# Patient Record
Sex: FEMALE | Race: OTHER | HISPANIC OR LATINO | URBAN - METROPOLITAN AREA
[De-identification: names, ages, dates, MRNs, and addresses within clinical notes are randomized per-mention and may not be internally consistent; named-entity substitution may affect disease eponyms.]

---

## 2021-05-24 ENCOUNTER — OUTPATIENT (OUTPATIENT)
Dept: OUTPATIENT SERVICES | Facility: HOSPITAL | Age: 66
LOS: 1 days | Discharge: HOME | End: 2021-05-24

## 2021-05-24 DIAGNOSIS — Z11.59 ENCOUNTER FOR SCREENING FOR OTHER VIRAL DISEASES: ICD-10-CM

## 2021-05-27 ENCOUNTER — OUTPATIENT (OUTPATIENT)
Dept: OUTPATIENT SERVICES | Facility: HOSPITAL | Age: 66
LOS: 1 days | Discharge: HOME | End: 2021-05-27
Payer: COMMERCIAL

## 2021-05-27 VITALS
OXYGEN SATURATION: 99 % | RESPIRATION RATE: 16 BRPM | HEART RATE: 82 BPM | SYSTOLIC BLOOD PRESSURE: 145 MMHG | TEMPERATURE: 98 F | DIASTOLIC BLOOD PRESSURE: 65 MMHG

## 2021-05-27 LAB
ANION GAP SERPL CALC-SCNC: 8 MMOL/L — SIGNIFICANT CHANGE UP (ref 7–14)
BUN SERPL-MCNC: 15 MG/DL — SIGNIFICANT CHANGE UP (ref 10–20)
CALCIUM SERPL-MCNC: 9.3 MG/DL — SIGNIFICANT CHANGE UP (ref 8.5–10.1)
CHLORIDE SERPL-SCNC: 102 MMOL/L — SIGNIFICANT CHANGE UP (ref 98–110)
CO2 SERPL-SCNC: 29 MMOL/L — SIGNIFICANT CHANGE UP (ref 17–32)
CREAT SERPL-MCNC: 0.7 MG/DL — SIGNIFICANT CHANGE UP (ref 0.7–1.5)
GLUCOSE SERPL-MCNC: 97 MG/DL — SIGNIFICANT CHANGE UP (ref 70–99)
HCT VFR BLD CALC: 34.5 % — LOW (ref 37–47)
HCT VFR BLD CALC: 35.3 % — LOW (ref 37–47)
HGB BLD-MCNC: 10.9 G/DL — LOW (ref 12–16)
HGB BLD-MCNC: 11.3 G/DL — LOW (ref 12–16)
MCHC RBC-ENTMCNC: 26.6 PG — LOW (ref 27–31)
MCHC RBC-ENTMCNC: 26.7 PG — LOW (ref 27–31)
MCHC RBC-ENTMCNC: 31.6 G/DL — LOW (ref 32–37)
MCHC RBC-ENTMCNC: 32 G/DL — SIGNIFICANT CHANGE UP (ref 32–37)
MCV RBC AUTO: 83.3 FL — SIGNIFICANT CHANGE UP (ref 81–99)
MCV RBC AUTO: 84.1 FL — SIGNIFICANT CHANGE UP (ref 81–99)
NRBC # BLD: 0 /100 WBCS — SIGNIFICANT CHANGE UP (ref 0–0)
NRBC # BLD: 0 /100 WBCS — SIGNIFICANT CHANGE UP (ref 0–0)
PLATELET # BLD AUTO: 250 K/UL — SIGNIFICANT CHANGE UP (ref 130–400)
PLATELET # BLD AUTO: 259 K/UL — SIGNIFICANT CHANGE UP (ref 130–400)
POTASSIUM SERPL-MCNC: 4.6 MMOL/L — SIGNIFICANT CHANGE UP (ref 3.5–5)
POTASSIUM SERPL-SCNC: 4.6 MMOL/L — SIGNIFICANT CHANGE UP (ref 3.5–5)
RBC # BLD: 4.1 M/UL — LOW (ref 4.2–5.4)
RBC # BLD: 4.24 M/UL — SIGNIFICANT CHANGE UP (ref 4.2–5.4)
RBC # FLD: 13 % — SIGNIFICANT CHANGE UP (ref 11.5–14.5)
RBC # FLD: 13.1 % — SIGNIFICANT CHANGE UP (ref 11.5–14.5)
SODIUM SERPL-SCNC: 139 MMOL/L — SIGNIFICANT CHANGE UP (ref 135–146)
WBC # BLD: 10.01 K/UL — SIGNIFICANT CHANGE UP (ref 4.8–10.8)
WBC # BLD: 8.38 K/UL — SIGNIFICANT CHANGE UP (ref 4.8–10.8)
WBC # FLD AUTO: 10.01 K/UL — SIGNIFICANT CHANGE UP (ref 4.8–10.8)
WBC # FLD AUTO: 8.38 K/UL — SIGNIFICANT CHANGE UP (ref 4.8–10.8)

## 2021-05-27 PROCEDURE — 93458 L HRT ARTERY/VENTRICLE ANGIO: CPT | Mod: 26,XU

## 2021-05-27 PROCEDURE — 92978 ENDOLUMINL IVUS OCT C 1ST: CPT | Mod: 26,RC

## 2021-05-27 PROCEDURE — 92928 PRQ TCAT PLMT NTRAC ST 1 LES: CPT | Mod: RC

## 2021-05-27 PROCEDURE — 93010 ELECTROCARDIOGRAM REPORT: CPT

## 2021-05-27 RX ORDER — LOSARTAN POTASSIUM 100 MG/1
1 TABLET, FILM COATED ORAL
Qty: 0 | Refills: 0 | DISCHARGE

## 2021-05-27 RX ORDER — CLOPIDOGREL BISULFATE 75 MG/1
1 TABLET, FILM COATED ORAL
Qty: 30 | Refills: 1
Start: 2021-05-27 | End: 2021-07-25

## 2021-05-27 RX ORDER — PIMECROLIMUS 10 MG/G
1 CREAM TOPICAL
Qty: 0 | Refills: 0 | DISCHARGE

## 2021-05-27 RX ORDER — ATORVASTATIN CALCIUM 80 MG/1
1 TABLET, FILM COATED ORAL
Qty: 30 | Refills: 1
Start: 2021-05-27 | End: 2021-07-25

## 2021-05-27 RX ORDER — ASPIRIN/CALCIUM CARB/MAGNESIUM 324 MG
1 TABLET ORAL
Qty: 30 | Refills: 1
Start: 2021-05-27 | End: 2021-07-25

## 2021-05-27 RX ORDER — METOPROLOL TARTRATE 50 MG
1 TABLET ORAL
Qty: 30 | Refills: 1
Start: 2021-05-27 | End: 2021-07-25

## 2021-05-27 RX ORDER — OLOPATADINE HYDROCHLORIDE 1 MG/ML
1 SOLUTION/ DROPS OPHTHALMIC
Qty: 0 | Refills: 0 | DISCHARGE

## 2021-05-27 RX ORDER — ATORVASTATIN CALCIUM 80 MG/1
1 TABLET, FILM COATED ORAL
Qty: 0 | Refills: 0 | DISCHARGE

## 2021-05-27 NOTE — H&P CARDIOLOGY - HISTORY OF PRESENT ILLNESS
Patient is a 65y Female who presents to the cardiology department for [facility].       Pre cath note:    indication:  [ ] STEMI                [ ] NSTEMI                 [ ] Acute coronary syndrome                     [ ]Unstable Angina   [ ] high risk  [ ] intermediate risk  [ ] low risk                     [ ] Stable Angina     non-invasive testing:                          Date:        5/14/21      result: [x] high risk  [ ] intermediate risk  [ ] low risk    Anti- Anginal medications:                    [ ] not used                       [ ] used                   [ ] not used but strong indication not to use    Ejection Fraction                   [ ] <29            [ ] 30-39%   [ ] 40-49%     [ x>50%    CHF                   [ ] active (within last 14 days on meds   [ ] Chronic (on meds but no exacerbation)    COPD                   [ ] mild (on chronic bronchodilators)  [ ] moderate (on chronic steroid therapy)      [ ] severe (indication for home O2 or PACO2 >50)    Other risk factors:                       [ ] Previous MI                     [ ] CVA/ stroke                    [ ] carotid stent/ CEA                    [ ] PVD/PAD- (arterial aneurysm, non-palpable pulses, tortuous vessel with inability to insert catheter, infra-renal dissection, renal or subclavian artery stenosis)                    [ ] diabetic                    [ ] previous CABG                    [ ] Renal Failure       Adjusted Bleeding Score:     SUBJ:  64 y/o female presents for [facility] s/p Positive out patient exercise stress test 5/14/21. Impression: Significant  ST segment changes concerning for ischemia in inferolateral leads.           REVIEW OF SYSTEMS:  CONSTITUTIONAL: No fever, weight loss, or fatigue  CARDIOLOGY: PAtient denies chest pain, shortness of breath or syncopal episodes.   RESPIRATORY: denies shortness of breath, wheezing.   NEUROLOGICAL: NO weakness, no focal deficits to report.  GI: no BRBPR, no N,V,diarrhea.     PHYSICAL EXAM:  · CONSTITUTIONAL:	Well-developed, well nourished     · RESPIRATORY:   airway patent; breath sounds equal; good air movement; respirations non-labored; clear to auscultation bilaterally; no chest wall tenderness; no intercostal retractions; no rales,rhonchi or wheeze  · CARDIOVASCULAR	regular rate and rhythm  no rub  no murmur    · EXTREMITIES: No cyanosis, clubbing or edema  · VASCULAR: 	Equal and normal pulses (carotid, femoral, dorsalis pedis)  	  Joe Test        Patient is a 65y Female who presents to the cardiology department for [facility].       Pre cath note:    indication:  [ ] STEMI                [ ] NSTEMI                 [ ] Acute coronary syndrome                     [ ]Unstable Angina   [ ] high risk  [ ] intermediate risk  [ ] low risk                     [ ] Stable Angina     non-invasive testing:                          Date:        21      result: [x] high risk  [ ] intermediate risk  [ ] low risk    Anti- Anginal medications:                    [ ] not used                       [ ] used                   [ ] not used but strong indication not to use    Ejection Fraction                   [ ] <29            [ ] 30-39%   [ ] 40-49%     [ x>50%    CHF                   [ ] active (within last 14 days on meds   [ ] Chronic (on meds but no exacerbation)    COPD                   [ ] mild (on chronic bronchodilators)  [ ] moderate (on chronic steroid therapy)      [ ] severe (indication for home O2 or PACO2 >50)    Other risk factors:                       [ ] Previous MI                     [ ] CVA/ stroke                    [ ] carotid stent/ CEA                    [ ] PVD/PAD- (arterial aneurysm, non-palpable pulses, tortuous vessel with inability to insert catheter, infra-renal dissection, renal or subclavian artery stenosis)                    [ ] diabetic                    [ ] previous CABG                    [ ] Renal Failure       Adjusted Bleeding Score:  2.4%         SUBJ:  66 y/o female presents for [facility] s/p Positive out patient exercise stress test 21. Impression: Significant  ST segment changes concerning for ischemia in inferolateral leads. Pt c/o chest tightness intermittently for 1 year.  Pt currently denies chest pain, no SOB, No palpitations.     Past Medical History:  HTN  HLD        Past Surgical History:  Nasal surgery    Family History:   Father:   of MI in 30's  Mother:  CAD      REVIEW OF SYSTEMS:  CONSTITUTIONAL: No fever, weight loss, or fatigue  CARDIOLOGY: Patient denies chest pain, shortness of breath or syncopal episodes.   RESPIRATORY: denies shortness of breath, wheezing.   NEUROLOGICAL: NO weakness, no focal deficits to report.  GI: no BRBPR, no N,V,diarrhea.     PHYSICAL EXAM:  · CONSTITUTIONAL:	Well-developed, well nourished     · RESPIRATORY:   airway patent; breath sounds equal; good air movement; respirations non-labored; clear to auscultation bilaterally; no chest wall tenderness; no intercostal retractions; no rales,rhonchi or wheeze  · CARDIOVASCULAR	regular rate and rhythm  no rub  no murmur    · EXTREMITIES: No cyanosis, clubbing or edema  · VASCULAR: 	Equal and normal pulses (carotid, femoral, dorsalis pedis)  	  Joe Test WNL

## 2021-05-27 NOTE — PROGRESS NOTE ADULT - SUBJECTIVE AND OBJECTIVE BOX
Cardiology Follow up    IMAN OGLESBY   65y Female  PAST MEDICAL & SURGICAL HISTORY:  Prediabetes    Hypertension    DDD (degenerative disc disease), lumbar    Obesity         HPI:  Patient is a 65y Female who presents to the cardiology department for [facility].     SUBJ:  64 y/o female presents for [facility] s/p Positive out patient exercise stress test 21. Impression: Significant  ST segment changes concerning for ischemia in inferolateral leads. Pt c/o chest tightness intermittently for 1 year.  Pt currently denies chest pain, no SOB, No palpitations.     Past Medical History:  HTN  HLD        Past Surgical History:  Nasal surgery    Family History:   Father:   of MI in 30's  Mother:  CAD          Allergies    acetaminophen (Rash)  diphenhydrAMINE (Rash)  peanuts (Anaphylaxis)  penicillin (Short breath)  pseudoephedrine (Rash)  shellfish (Short breath; Hives)  strawberry (Hives)  sulfanilamide topical (Rash)    Intolerances      Patient without complaints. Pt ambulated without issues/symptoms  Denies CP, SOB, palpitations, or dizziness  No events on telemetry     Vital Signs Last 24 Hrs    HR: 68  BP: 119/59  RR: 16  SpO2: 99%           REVIEW OF SYSTEMS:          All negative except as mentioned in HPI    PHYSICAL EXAM:           CONSTITUTIONAL: Well-developed; well-nourished; in no acute distress  	SKIN: warm, dry  	HEAD: Normocephalic; atraumatic  	EYES: PERRL.  	ENT: No nasal discharge, airway clear, mucous membranes moist  	NECK: Supple; non tender.  	CARD: +S1, +S2, no murmurs, gallops, or rubs. Regular rate and rhythm    	RESP: No wheezes, rales or rhonchi. CTA B/L  	ABD: soft ntnd, + BS x 4 quadrants  	EXT: moves all extremities,  no clubbing, cyanosis or edema  	NEURO: Alert and oriented x3, no focal deficits          PSYCH: Cooperative, appropriate          VASCULAR:  +2 Rad / +2 PTs / + 2 DPs          EXTREMITY:             Right Groin:  Dressing D/C/I, access site soft, no hematoma, no pain, + pulses , no sign of infection, no numbness  	               ECG:   EKG 1600 PENDING                                                                                                                     LABS:        CBC 1600 Pending                         10.9   8.38  )-----------( 250      ( 27 May 2021 10:29 )             34.5     05-    139  |  102  |  15  ----------------------------<  97  4.6   |  29  |  0.7    Ca    9.3      27 May 2021 10:29              A/P:  I discussed the case with Cardiologist Dr. De La Torre  and recommend the following:    S/P PCI:    INTERVENTION  PCI of prox and mid RCA    IMPLANTS:  Synergy 3.0 38, 3.5 12    POST-OP DIAGNOSIS  1 V CAD          	         Continue DAPT ( Aspirin 81 mg PO Daily and   Plavix 75mg po daily  ),  B-Blocker, ARB,  Statin Therapy                   Patient given 30 day supply of ( Aspirin 81 mg daily and Plavix 75 mg daily ) to take at home                   Start Toprol XL 25mg po daily                   Increase Atorvastatin to 80mg po at HS                    CBC @ 1600                   EKG @ 1600                   NS @ 100 ml/hr x 6hrs                   Monitor access site                   Patient agreeing to take DAPT for at least one year or as directed by cardiologist                    Pt given instructions on importance of taking antiplatelet medication or risk acute stent thrombosis/death                   Post cath instructions, access site care and activity restrictions reviewed with patient                     Discussed with patient to return to hospital if experience chest pain, shortness breath, dizziness and site bleeding                   Aggressive risk factor modification, diet counseling, smoking cessation discussed with patient                       Cardiac Rehab info provided/ referral and communication to cardiac rehab provided.                    Can discharge patient @1800  from cardiac standpoint after ambulating without symptoms and access site wnl and ECG reviewed                    Follow up with Cardiology Dr. De La Torre in one to two weeks.  Instructed to call and make an appointment

## 2021-05-27 NOTE — CHART NOTE - NSCHARTNOTEFT_GEN_A_CORE
PRE-OP DIAGNOSIS: suspected CAD/abnormal stress test    PROCEDURE: [facility] with coronary angiography    Physician: DALILA De La Torre  Assistant: OCTAVIO Rey    ANESTHESIA TYPE:  [ x ] Sedation  [ x ] Local/Regional    ESTIMATED BLOOD LOSS:    10   mL    CONDITION  [ x ]Good    SPECIMENS REMOVED (IF APPLICABLE): None    IV CONTRAST: 150   mL    FINDINGS    Left Heart Catheterization:    LVEDP: 19  1 V CAD  Right radial 5 Fr - radial D stat  Right femoral 6 Fr  - Perclose    The coronary circulation is right dominant. There was 1-vessel coronary artery disease (RCA) with AUC score of 7 Left main: Normal. LAD: The vessel was medium sized. Proximal LAD: Angiography showed minor luminal irregularities with no flow limiting lesions. Mid LAD: The vessel was mildly calcified. Angiography showed moderate atherosclerosis with no clinical lesions appreciated. Distal LAD: Angiography showed minor luminal irregularities with no flow limiting lesions. 1st diagonal: Angiography showed mild atherosclerosis with no flow limiting lesions. Circumflex: The vessel was medium sized. Proximal circumflex: There was a discrete 60 % stenosis. Distal circumflex: Angiography showed mild atherosclerosis with no flow limiting lesions. 1st obtuse marginal: The vessel was small to medium sized. Angiography showed minor luminal irregularities with no flow limiting lesions. RCA: The vessel was medium sized. Proximal RCA: There was a diffuse 95 % stenosis. Mid RCA: There was a diffuse 80 % stenosis. Distal RCA: There was a discrete 40 % stenosis. Right PDA: Angiography showed minor luminal irregularities with no flow limiting lesions. Right posterolateral segment: Angiography showed minor luminal irregularities with no flow limiting lesions.     INTERVENTION  PCI of prox and mid RCA    IMPLANTS:  Synergy 3.0 38, 3.5 12    POST-OP DIAGNOSIS  1 V CAD    PLAN OF CARE  [x ] D/C Home today  [x ]  Continue DAPT, B-blocker & Statin therapy   cc/hr for 6 hr

## 2021-05-28 PROBLEM — M51.36 OTHER INTERVERTEBRAL DISC DEGENERATION, LUMBAR REGION: Chronic | Status: ACTIVE | Noted: 2021-05-27

## 2021-05-28 PROBLEM — I10 ESSENTIAL (PRIMARY) HYPERTENSION: Chronic | Status: ACTIVE | Noted: 2021-05-27

## 2021-05-28 PROBLEM — R73.03 PREDIABETES: Chronic | Status: ACTIVE | Noted: 2021-05-27

## 2021-05-28 PROBLEM — E66.9 OBESITY, UNSPECIFIED: Chronic | Status: ACTIVE | Noted: 2021-05-27

## 2021-05-28 PROBLEM — Z00.00 ENCOUNTER FOR PREVENTIVE HEALTH EXAMINATION: Status: ACTIVE | Noted: 2021-05-28

## 2021-06-08 ENCOUNTER — NON-APPOINTMENT (OUTPATIENT)
Age: 66
End: 2021-06-08

## 2021-06-08 DIAGNOSIS — E66.9 OBESITY, UNSPECIFIED: ICD-10-CM

## 2021-06-08 DIAGNOSIS — R94.39 ABNORMAL RESULT OF OTHER CARDIOVASCULAR FUNCTION STUDY: ICD-10-CM

## 2021-06-08 DIAGNOSIS — Z88.2 ALLERGY STATUS TO SULFONAMIDES: ICD-10-CM

## 2021-06-08 DIAGNOSIS — I25.10 ATHEROSCLEROTIC HEART DISEASE OF NATIVE CORONARY ARTERY WITHOUT ANGINA PECTORIS: ICD-10-CM

## 2021-06-08 DIAGNOSIS — Z82.49 FAMILY HISTORY OF ISCHEMIC HEART DISEASE AND OTHER DISEASES OF THE CIRCULATORY SYSTEM: ICD-10-CM

## 2021-06-08 DIAGNOSIS — Z88.0 ALLERGY STATUS TO PENICILLIN: ICD-10-CM

## 2021-06-08 DIAGNOSIS — I10 ESSENTIAL (PRIMARY) HYPERTENSION: ICD-10-CM

## 2021-06-08 DIAGNOSIS — E78.00 PURE HYPERCHOLESTEROLEMIA, UNSPECIFIED: ICD-10-CM

## 2021-06-16 ENCOUNTER — APPOINTMENT (OUTPATIENT)
Dept: CARDIOLOGY | Facility: CLINIC | Age: 66
End: 2021-06-16
Payer: COMMERCIAL

## 2021-06-16 VITALS
WEIGHT: 207 LBS | HEART RATE: 71 BPM | SYSTOLIC BLOOD PRESSURE: 126 MMHG | DIASTOLIC BLOOD PRESSURE: 70 MMHG | TEMPERATURE: 98 F | HEIGHT: 59 IN | BODY MASS INDEX: 41.73 KG/M2

## 2021-06-16 DIAGNOSIS — Z78.9 OTHER SPECIFIED HEALTH STATUS: ICD-10-CM

## 2021-06-16 DIAGNOSIS — Z86.69 PERSONAL HISTORY OF OTHER DISEASES OF THE NERVOUS SYSTEM AND SENSE ORGANS: ICD-10-CM

## 2021-06-16 PROCEDURE — 93000 ELECTROCARDIOGRAM COMPLETE: CPT

## 2021-06-16 PROCEDURE — 99214 OFFICE O/P EST MOD 30 MIN: CPT

## 2021-06-16 PROCEDURE — 99072 ADDL SUPL MATRL&STAF TM PHE: CPT

## 2021-06-16 RX ORDER — PIMECROLIMUS 10 MG/G
1 CREAM TOPICAL
Refills: 0 | Status: ACTIVE | COMMUNITY

## 2021-06-16 RX ORDER — OLOPATADINE HCL 1 MG/ML
0.1 SOLUTION/ DROPS OPHTHALMIC
Refills: 0 | Status: ACTIVE | COMMUNITY

## 2021-06-16 NOTE — REASON FOR VISIT
[Symptom and Test Evaluation] : symptom and test evaluation [Arrhythmia/ECG Abnorrmalities] : arrhythmia/ECG abnormalities [Hyperlipidemia] : hyperlipidemia [Coronary Artery Disease] : coronary artery disease

## 2021-06-16 NOTE — PHYSICAL EXAM
[Well Developed] : well developed [Well Nourished] : well nourished [No Acute Distress] : no acute distress [Normal Venous Pressure] : normal venous pressure [No Carotid Bruit] : no carotid bruit [Normal S1, S2] : normal S1, S2 [No Murmur] : no murmur [No Rub] : no rub [No Gallop] : no gallop [Clear Lung Fields] : clear lung fields [Good Air Entry] : good air entry [No Respiratory Distress] : no respiratory distress  [Soft] : abdomen soft [Non Tender] : non-tender [No Masses/organomegaly] : no masses/organomegaly [Normal Bowel Sounds] : normal bowel sounds [Normal Gait] : normal gait [No Edema] : no edema [No Cyanosis] : no cyanosis [No Clubbing] : no clubbing [No Varicosities] : no varicosities [No Rash] : no rash [No Skin Lesions] : no skin lesions [Moves all extremities] : moves all extremities [No Focal Deficits] : no focal deficits [Normal Speech] : normal speech [Alert and Oriented] : alert and oriented [Normal memory] : normal memory

## 2021-06-21 NOTE — REVIEW OF SYSTEMS
[Negative] : Heme/Lymph [Feeling Fatigued] : feeling fatigued [Myalgia] : myalgia [FreeTextEntry5] : see hpi

## 2021-06-21 NOTE — HISTORY OF PRESENT ILLNESS
[FreeTextEntry1] : 66 yo female with pmhx as below is here for a f/up visit\par 05/21 s/p cath/pci of rca with rashmi\par post pci course sign for fatigue, sob and declined in ET\par on short term leave from work\par not sure if new meds attributing to the issues\par no other cvs complains\par ros is otherwise as below\par

## 2021-06-21 NOTE — ASSESSMENT
[FreeTextEntry1] : 66 yo female with pmhx and presentation as above\par cad/pci of rca with rashmi\par dyslipidemia\par htn\par cont all meds\par change bb to qhs\par may try taking lipitor 4-5/weekly\par add zetia\par stay off work until symptoms resolve\par aggressive risk modif\par diet and act as tolerated\par rtc 6-8 weeks\par

## 2021-08-25 ENCOUNTER — APPOINTMENT (OUTPATIENT)
Dept: CARDIOLOGY | Facility: CLINIC | Age: 66
End: 2021-08-25
Payer: COMMERCIAL

## 2021-08-25 VITALS
TEMPERATURE: 97.8 F | BODY MASS INDEX: 41.73 KG/M2 | WEIGHT: 207 LBS | HEART RATE: 64 BPM | SYSTOLIC BLOOD PRESSURE: 120 MMHG | HEIGHT: 59 IN | DIASTOLIC BLOOD PRESSURE: 70 MMHG

## 2021-08-25 DIAGNOSIS — R94.31 ABNORMAL ELECTROCARDIOGRAM [ECG] [EKG]: ICD-10-CM

## 2021-08-25 PROCEDURE — 93000 ELECTROCARDIOGRAM COMPLETE: CPT

## 2021-08-25 PROCEDURE — 99214 OFFICE O/P EST MOD 30 MIN: CPT

## 2021-08-25 RX ORDER — ACETAMINOPHEN AND CODEINE 300; 30 MG/1; MG/1
300-30 TABLET ORAL 3 TIMES DAILY
Refills: 0 | Status: ACTIVE | COMMUNITY

## 2021-08-25 RX ORDER — ASPIRIN 81 MG
81 TABLET, DELAYED RELEASE (ENTERIC COATED) ORAL DAILY
Refills: 0 | Status: DISCONTINUED | COMMUNITY
End: 2021-08-25

## 2021-08-25 NOTE — REVIEW OF SYSTEMS
[Feeling Fatigued] : feeling fatigued [Myalgia] : myalgia [Negative] : Heme/Lymph [FreeTextEntry5] : see hpi

## 2021-08-25 NOTE — ASSESSMENT
[FreeTextEntry1] : 64 yo female with pmhx and presentation as above\par cad/pci of rca with rashmi\par dyslipidemia\par htn\par cont all meds\par change bb to qhs\par may try taking lipitor 40 mg 4-5/weekly\par cont zetia 10 mg\par stay off work until symptoms resolve\par pain mx\par if symptoms continue -  will reapply for repatha or praluent\par aggressive risk modif\par diet and act as tolerated\par rtc 10-12 weeks with Dr. Templeton\par

## 2021-11-17 ENCOUNTER — APPOINTMENT (OUTPATIENT)
Dept: CARDIOLOGY | Facility: CLINIC | Age: 66
End: 2021-11-17
Payer: COMMERCIAL

## 2021-11-17 VITALS
WEIGHT: 205 LBS | TEMPERATURE: 96 F | DIASTOLIC BLOOD PRESSURE: 70 MMHG | SYSTOLIC BLOOD PRESSURE: 110 MMHG | BODY MASS INDEX: 41.33 KG/M2 | HEIGHT: 59 IN

## 2021-11-17 PROCEDURE — 99214 OFFICE O/P EST MOD 30 MIN: CPT

## 2022-01-04 ENCOUNTER — EMERGENCY (EMERGENCY)
Facility: HOSPITAL | Age: 67
LOS: 0 days | Discharge: HOME | End: 2022-01-04
Attending: EMERGENCY MEDICINE | Admitting: EMERGENCY MEDICINE
Payer: COMMERCIAL

## 2022-01-04 ENCOUNTER — APPOINTMENT (OUTPATIENT)
Age: 67
End: 2022-01-04

## 2022-01-04 ENCOUNTER — TRANSCRIPTION ENCOUNTER (OUTPATIENT)
Age: 67
End: 2022-01-04

## 2022-01-04 ENCOUNTER — OUTPATIENT (OUTPATIENT)
Dept: INPATIENT UNIT | Facility: HOSPITAL | Age: 67
LOS: 1 days | Discharge: HOME | End: 2022-01-04

## 2022-01-04 VITALS
RESPIRATION RATE: 18 BRPM | SYSTOLIC BLOOD PRESSURE: 131 MMHG | DIASTOLIC BLOOD PRESSURE: 75 MMHG | TEMPERATURE: 98 F | HEART RATE: 84 BPM | OXYGEN SATURATION: 97 %

## 2022-01-04 VITALS
TEMPERATURE: 98 F | DIASTOLIC BLOOD PRESSURE: 78 MMHG | RESPIRATION RATE: 17 BRPM | HEART RATE: 86 BPM | OXYGEN SATURATION: 96 % | SYSTOLIC BLOOD PRESSURE: 122 MMHG

## 2022-01-04 VITALS
TEMPERATURE: 98 F | SYSTOLIC BLOOD PRESSURE: 146 MMHG | OXYGEN SATURATION: 95 % | RESPIRATION RATE: 20 BRPM | DIASTOLIC BLOOD PRESSURE: 68 MMHG | HEART RATE: 92 BPM

## 2022-01-04 DIAGNOSIS — Z87.891 PERSONAL HISTORY OF NICOTINE DEPENDENCE: ICD-10-CM

## 2022-01-04 DIAGNOSIS — Z95.5 PRESENCE OF CORONARY ANGIOPLASTY IMPLANT AND GRAFT: ICD-10-CM

## 2022-01-04 DIAGNOSIS — Z88.0 ALLERGY STATUS TO PENICILLIN: ICD-10-CM

## 2022-01-04 DIAGNOSIS — R50.9 FEVER, UNSPECIFIED: ICD-10-CM

## 2022-01-04 DIAGNOSIS — I10 ESSENTIAL (PRIMARY) HYPERTENSION: ICD-10-CM

## 2022-01-04 DIAGNOSIS — U07.1 COVID-19: ICD-10-CM

## 2022-01-04 DIAGNOSIS — J02.9 ACUTE PHARYNGITIS, UNSPECIFIED: ICD-10-CM

## 2022-01-04 DIAGNOSIS — Z88.2 ALLERGY STATUS TO SULFONAMIDES: ICD-10-CM

## 2022-01-04 DIAGNOSIS — I25.10 ATHEROSCLEROTIC HEART DISEASE OF NATIVE CORONARY ARTERY WITHOUT ANGINA PECTORIS: ICD-10-CM

## 2022-01-04 DIAGNOSIS — Z91.013 ALLERGY TO SEAFOOD: ICD-10-CM

## 2022-01-04 DIAGNOSIS — Z88.8 ALLERGY STATUS TO OTHER DRUGS, MEDICAMENTS AND BIOLOGICAL SUBSTANCES: ICD-10-CM

## 2022-01-04 DIAGNOSIS — Z91.010 ALLERGY TO PEANUTS: ICD-10-CM

## 2022-01-04 DIAGNOSIS — Z91.018 ALLERGY TO OTHER FOODS: ICD-10-CM

## 2022-01-04 DIAGNOSIS — R05.1 ACUTE COUGH: ICD-10-CM

## 2022-01-04 LAB
RAPID RVP RESULT: DETECTED
SARS-COV-2 RNA SPEC QL NAA+PROBE: DETECTED

## 2022-01-04 PROCEDURE — 99284 EMERGENCY DEPT VISIT MOD MDM: CPT

## 2022-01-04 RX ORDER — SOTROVIMAB 62.5 MG/ML
500 INJECTION, SOLUTION, CONCENTRATE INTRAVENOUS ONCE
Refills: 0 | Status: COMPLETED | OUTPATIENT
Start: 2022-01-04 | End: 2022-01-04

## 2022-01-04 RX ORDER — DEXAMETHASONE 0.5 MG/5ML
10 ELIXIR ORAL ONCE
Refills: 0 | Status: COMPLETED | OUTPATIENT
Start: 2022-01-04 | End: 2022-01-04

## 2022-01-04 RX ORDER — SODIUM CHLORIDE 9 MG/ML
250 INJECTION INTRAMUSCULAR; INTRAVENOUS; SUBCUTANEOUS
Refills: 0 | Status: DISCONTINUED | OUTPATIENT
Start: 2022-01-04 | End: 2022-01-04

## 2022-01-04 RX ADMIN — Medication 4 MILLIGRAM(S): at 07:35

## 2022-01-04 RX ADMIN — SODIUM CHLORIDE 25 MILLILITER(S): 9 INJECTION INTRAMUSCULAR; INTRAVENOUS; SUBCUTANEOUS at 14:39

## 2022-01-04 RX ADMIN — SOTROVIMAB 116 MILLIGRAM(S): 62.5 INJECTION, SOLUTION, CONCENTRATE INTRAVENOUS at 12:25

## 2022-01-04 NOTE — ED PROVIDER NOTE - PATIENT PORTAL LINK FT
You can access the FollowMyHealth Patient Portal offered by [facility] by registering at the following website: [email or web address]. By joining [employer]’s FollowMyHealth portal, you will also be able to view your health information using other applications (apps) compatible with our system.

## 2022-01-04 NOTE — ED PROVIDER NOTE - NSICDXPASTMEDICALHX_GEN_ALL_CORE_FT
PAST MEDICAL HISTORY:  DDD (degenerative disc disease), lumbar     Hypertension     Obesity     Prediabetes

## 2022-01-04 NOTE — CHART NOTE - NSCHARTNOTEFT_GEN_A_CORE
Medicine Progress Note    Patient is a 66y old  Female who presents with a chief complaint of Covid+      PHYSICAL EXAM:  Vital Signs Last 24 Hrs  T(C): 36.6 (04 Jan 2022 13:55), Max: 36.7 (04 Jan 2022 12:25)  T(F): 97.9 (04 Jan 2022 13:55), Max: 98.1 (04 Jan 2022 12:35)  HR: 86 (04 Jan 2022 13:55) (79 - 92)  BP: 122/78 (04 Jan 2022 13:55) (119/82 - 146/68)  BP(mean): --  RR: 17 (04 Jan 2022 13:55) (17 - 20)  SpO2: 96% (04 Jan 2022 13:55) (95% - 97%)    CONSTITUTIONAL: NAD, well-developed, well-groomed  ENMT: Moist oral mucosa, no pharyngeal injection or exudates; normal dentition  RESPIRATORY: Normal respiratory effort; lungs are clear to auscultation bilaterally  CARDIOVASCULAR: Regular rate and rhythm, normal S1 and S2, no murmur/rub/gallop; No lower extremity edema; Peripheral pulses are 2+ bilaterally  ABDOMEN: Nontender to palpation, normoactive bowel sounds, no rebound/guarding; No hepatosplenomegaly  PSYCH: A+O to person, place, and time; affect appropriate  NEUROLOGY: CN 2-12 are intact and symmetric; no gross sensory deficits   SKIN: No rashes; no palpable lesions      Pt s/p Ab infusion without complications  -tyl prn temp  -if worsening of condition present to ED

## 2022-01-04 NOTE — ED PROVIDER NOTE - ATTENDING CONTRIBUTION TO CARE
I personally evaluated the patient. I reviewed the Resident’s or Physician Assistant’s note (as assigned above), and agree with the findings and plan except as documented in my note.  66-year-old woman with history of diabetes obesity unvaccinated to COVID presents with sore throat cough fever and malaise for about 5 days patient had a positive COVID test about 2 days ago.  No hypoxemia no evidence increased work of breathing patient's vital signs are otherwise stable.  Work-up in the ED appears normal.  Patient is a good candidate for monoclonal antibodies.  Signed up for monoclonal antibodies to receive infusion.  Unfortunately patient is too late for oral antivirals.  Stable for discharge with pulse ox monitoring at home.  Return if O2 sat less than 94%.

## 2022-01-04 NOTE — ED PROVIDER NOTE - OBJECTIVE STATEMENT
Pt is a 65 yo F w/ pmhx of CAD s/p PCI, HTN presenting w/ cough, sore throat, subjective fever since last thursday. Pt is unvaccinated. She is requesting a swab today. denies any SOB or chest pain.

## 2022-01-04 NOTE — ED PROVIDER NOTE - CLINICAL SUMMARY MEDICAL DECISION MAKING FREE TEXT BOX
66-year-old woman with positive COVID test obesity diabetes unvaccinated.  Patient is very high risk for deterioration.  Symptoms for less than 5 days.  Will sign up for monoclonal antibodies.  No evidence of hypoxia or increased work of breathing.  Stable for discharge for outpatient infusion.  Pulse ox monitoring at home.  If less than 94% needs to return to ED for evaluation.  Stable for discharge and outpatient monitoring at this time.

## 2022-01-04 NOTE — ED PROVIDER NOTE - CARE PROVIDER_API CALL
ALLA OLIVARES  Internal Medicine  [address] [place] [address]  Phone: [phone]  Fax: [phone]  Follow Up Time:

## 2022-01-04 NOTE — ED PROVIDER NOTE - PHYSICAL EXAMINATION
CONSTITUTIONAL: Well-developed; well-nourished; in no acute distress.   SKIN: warm, dry  HEAD: Normocephalic; atraumatic.  EYES: PERRL, EOMI, normal sclera and conjunctiva   ENT: No nasal discharge; airway clear., + erythematous posterior oropharynx, no exudates no tonsillar hypertrophy   NECK: Supple; non tender.  CARD:  Regular rate and rhythm.   RESP: NO inc WOB , speaking in full sentences, lungs CTAB  ABD: soft ntnd  EXT: Normal ROM.  no LE edema no unilateral leg swelling  NEURO: Alert, oriented, grossly unremarkable  PSYCH: Cooperative, appropriate.

## 2022-04-13 ENCOUNTER — APPOINTMENT (OUTPATIENT)
Dept: CARDIOLOGY | Facility: CLINIC | Age: 67
End: 2022-04-13
Payer: COMMERCIAL

## 2022-04-13 VITALS
HEART RATE: 73 BPM | DIASTOLIC BLOOD PRESSURE: 73 MMHG | WEIGHT: 211 LBS | BODY MASS INDEX: 42.54 KG/M2 | HEIGHT: 59 IN | SYSTOLIC BLOOD PRESSURE: 128 MMHG

## 2022-04-13 PROCEDURE — 99213 OFFICE O/P EST LOW 20 MIN: CPT

## 2022-06-17 NOTE — ED ADULT TRIAGE NOTE - PAIN: PRESENCE, MLM
complains of pain/discomfort Location Indication Override (Is Already Calculated Based On Selected Body Location): Area H

## 2022-08-26 ENCOUNTER — APPOINTMENT (OUTPATIENT)
Dept: CARDIOLOGY | Facility: CLINIC | Age: 67
End: 2022-08-26

## 2022-08-26 VITALS
HEIGHT: 59 IN | WEIGHT: 213 LBS | DIASTOLIC BLOOD PRESSURE: 72 MMHG | SYSTOLIC BLOOD PRESSURE: 124 MMHG | BODY MASS INDEX: 42.94 KG/M2

## 2022-08-26 PROCEDURE — 99214 OFFICE O/P EST MOD 30 MIN: CPT

## 2022-08-26 RX ORDER — NAPROXEN 500 MG/1
500 TABLET ORAL
Refills: 0 | Status: DISCONTINUED | COMMUNITY
End: 2022-08-26

## 2022-10-27 ENCOUNTER — APPOINTMENT (OUTPATIENT)
Dept: CARDIOLOGY | Facility: CLINIC | Age: 67
End: 2022-10-27

## 2022-12-08 ENCOUNTER — APPOINTMENT (OUTPATIENT)
Dept: CARDIOLOGY | Facility: CLINIC | Age: 67
End: 2022-12-08

## 2023-01-01 NOTE — ED ADULT NURSE NOTE - PRO INTERPRETER NEED 2
From: Sunitha Bunn  To: Courtney Pascual  Sent: 2023 7:24 AM CDT  Subject: Cold symptoms    This message is being sent by Salud Bunn on behalf of Sunitha Bunn.    Good morning,    Over the past 24 hours, it seems like Sunitha has developed a cold. She appears congested and has a cough with more frequent sneezing. With her being less than 3 months old, I am not sure if this is something you need to be aware of. If you have any remedies you recommend, I would appreciate hearing from you!    Thank you  Salud Bunn   
English

## 2023-03-01 ENCOUNTER — RESULT CHARGE (OUTPATIENT)
Age: 68
End: 2023-03-01

## 2023-03-01 ENCOUNTER — APPOINTMENT (OUTPATIENT)
Dept: CARDIOLOGY | Facility: CLINIC | Age: 68
End: 2023-03-01
Payer: MEDICARE

## 2023-03-01 VITALS
WEIGHT: 215 LBS | HEART RATE: 76 BPM | SYSTOLIC BLOOD PRESSURE: 122 MMHG | HEIGHT: 59 IN | BODY MASS INDEX: 43.34 KG/M2 | DIASTOLIC BLOOD PRESSURE: 70 MMHG

## 2023-03-01 DIAGNOSIS — R06.02 SHORTNESS OF BREATH: ICD-10-CM

## 2023-03-01 PROCEDURE — 93000 ELECTROCARDIOGRAM COMPLETE: CPT

## 2023-03-01 PROCEDURE — 99214 OFFICE O/P EST MOD 30 MIN: CPT

## 2023-03-01 NOTE — REVIEW OF SYSTEMS
[SOB] : no shortness of breath [Dyspnea on exertion] : dyspnea during exertion [Chest Discomfort] : no chest discomfort [Palpitations] : no palpitations [Syncope] : no syncope [Negative] : Psychiatric

## 2023-03-01 NOTE — ASSESSMENT
[FreeTextEntry1] : 1) CAD s/p PCI to RCA on 5/2021 , moderate disease  in LAD and LCx. \par continue dapt statin and BB \par complains of dyspnea on exertion last few months will do echo and stress nuclear for further evaluation \par \par 2)DL/obesity \par  (decreased from 130's)\par continue high dose statin medication renewed\par patient advised with strict diet control and exercise\par \par 3)HTN controlled\par \par Follow up in 3 months.

## 2023-03-01 NOTE — HISTORY OF PRESENT ILLNESS
[FreeTextEntry1] : Ms Kam is a 67 year old female patient with PMHx of CAD PCI to RCA (5/21), HTN, DL, asthma and obesity used to follow with Dr Templeton. Presented today for follow up. \par Currently stable denies chest pain or palpitations however she complains of dyspnea on exertion since few months ago. no syncope no active shortness of breath at rest. \par She had COVID infection lately , recent CXR unremarkable. \par EKG sinus rhythm no acute changes. \par supposed to get echo and stress test (not done before for changing insurance now it is solved).

## 2023-03-01 NOTE — PHYSICAL EXAM
[No Respiratory Distress] : no respiratory distress  [Normal] : no edema, no cyanosis, no clubbing, no varicosities [Moves all extremities] : moves all extremities [No Focal Deficits] : no focal deficits [Alert and Oriented] : alert and oriented

## 2023-03-04 NOTE — PROGRESS NOTE ADULT - NSICDXPILOT_GEN_ALL_CORE
Patient:  Thao Jain Date:  3/4/2023   :  1963 Attending:  Ephraim Bryant MD   59 year old female MRN:  [id number]     Subjective:   The patient is a 59 year old female who is being seen by GI for PEG tube placement.  There was some reported leakage around the PEG tube site.      Objective:   Vitals:    23 1342   BP: (!) 140/77   Pulse:    Resp:    Temp:        Exam:  Dressing noted below the bumper, some evidence of leak around, (bumper at 4 cm)    Labs  Lab Results   Component Value Date    SODIUM 137 2023    POTASSIUM 4.6 2023    CHLORIDE 107 2023    CO2 26 2023    GLUCOSE 132 (H) 2023    BUN 38 (H) 2023    CREATININE 1.34 (H) 2023    KAYLYNN 1.29 2023    ALBUMIN 2.2 (L) 2023    BILIRUBIN 0.4 2023    LACTA 1.9 2023    AST 24 2023    PHOS 3.8 2023    INR 1.1 2023     Lab Results   Component Value Date    PTT 31 (H) 2023    WBC 5.8 2023    HGB 8.5 (L) 2023    HCT 28.1 (L) 2023     2023     2023    NH3 59 (H) 2023    CRP 0.9 2023    TSH 3.490 2023       Imaging:       Assessment:  1. S/P PEG tube placement     Plans/Recommendations:  1. No dressing below the bumper  2. Ok for meds and water flushes as tolerated.   3. Monitor bowel movements   4. Will reassess in AM         Yevgeniy Faria MD    3/4/2023  2:41 PM    PAGER # [phone]   [place]

## 2023-03-07 ENCOUNTER — OUTPATIENT (OUTPATIENT)
Dept: OUTPATIENT SERVICES | Facility: HOSPITAL | Age: 68
LOS: 1 days | End: 2023-03-07
Payer: MEDICARE

## 2023-03-07 ENCOUNTER — RESULT REVIEW (OUTPATIENT)
Age: 68
End: 2023-03-07

## 2023-03-07 DIAGNOSIS — I25.10 ATHEROSCLEROTIC HEART DISEASE OF NATIVE CORONARY ARTERY WITHOUT ANGINA PECTORIS: ICD-10-CM

## 2023-03-07 DIAGNOSIS — Z98.61 CORONARY ANGIOPLASTY STATUS: ICD-10-CM

## 2023-03-07 DIAGNOSIS — R06.02 SHORTNESS OF BREATH: ICD-10-CM

## 2023-03-07 PROCEDURE — 78452 HT MUSCLE IMAGE SPECT MULT: CPT

## 2023-03-07 PROCEDURE — A9500: CPT

## 2023-03-07 PROCEDURE — 93018 CV STRESS TEST I&R ONLY: CPT

## 2023-03-07 PROCEDURE — 78452 HT MUSCLE IMAGE SPECT MULT: CPT | Mod: 26

## 2023-03-08 DIAGNOSIS — I25.10 ATHEROSCLEROTIC HEART DISEASE OF NATIVE CORONARY ARTERY WITHOUT ANGINA PECTORIS: ICD-10-CM

## 2023-03-08 DIAGNOSIS — R06.02 SHORTNESS OF BREATH: ICD-10-CM

## 2023-03-16 DIAGNOSIS — R06.02 SHORTNESS OF BREATH: ICD-10-CM

## 2023-03-17 DIAGNOSIS — R06.02 SHORTNESS OF BREATH: ICD-10-CM

## 2023-03-21 ENCOUNTER — APPOINTMENT (OUTPATIENT)
Dept: CARDIOLOGY | Facility: CLINIC | Age: 68
End: 2023-03-21
Payer: MEDICARE

## 2023-03-21 PROCEDURE — 93306 TTE W/DOPPLER COMPLETE: CPT

## 2023-04-09 ENCOUNTER — EMERGENCY (EMERGENCY)
Facility: HOSPITAL | Age: 68
LOS: 0 days | Discharge: HOME | End: 2023-04-09
Attending: STUDENT IN AN ORGANIZED HEALTH CARE EDUCATION/TRAINING PROGRAM
Payer: MEDICARE

## 2023-04-09 VITALS
DIASTOLIC BLOOD PRESSURE: 70 MMHG | TEMPERATURE: 99 F | RESPIRATION RATE: 16 BRPM | OXYGEN SATURATION: 97 % | HEART RATE: 83 BPM | SYSTOLIC BLOOD PRESSURE: 130 MMHG

## 2023-04-09 DIAGNOSIS — Z91.010 ALLERGY TO PEANUTS: ICD-10-CM

## 2023-04-09 DIAGNOSIS — Z91.013 ALLERGY TO SEAFOOD: ICD-10-CM

## 2023-04-09 DIAGNOSIS — Z79.02 LONG TERM (CURRENT) USE OF ANTITHROMBOTICS/ANTIPLATELETS: ICD-10-CM

## 2023-04-09 DIAGNOSIS — H02.816: ICD-10-CM
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
## 2023-04-09 DIAGNOSIS — H57.89 OTHER SPECIFIED DISORDERS OF EYE AND ADNEXA: ICD-10-CM

## 2023-04-09 DIAGNOSIS — Z88.0 ALLERGY STATUS TO PENICILLIN: ICD-10-CM

## 2023-04-09 DIAGNOSIS — Z91.018 ALLERGY TO OTHER FOODS: ICD-10-CM

## 2023-04-09 DIAGNOSIS — Z88.8 ALLERGY STATUS TO OTHER DRUGS, MEDICAMENTS AND BIOLOGICAL SUBSTANCES: ICD-10-CM

## 2023-04-09 PROCEDURE — 99284 EMERGENCY DEPT VISIT MOD MDM: CPT

## 2023-04-09 RX ORDER — ERYTHROMYCIN BASE 5 MG/GRAM
1 OINTMENT (GRAM) OPHTHALMIC (EYE)
Qty: 1 | Refills: 0
Start: 2023-04-09 | End: 2023-04-15

## 2023-04-09 RX ORDER — ACETAMINOPHEN 500 MG
650 TABLET ORAL ONCE
Refills: 0 | Status: COMPLETED | OUTPATIENT
Start: 2023-04-09 | End: 2023-04-09

## 2023-04-09 RX ORDER — OFLOXACIN 0.3 %
1 DROPS OPHTHALMIC (EYE)
Qty: 1 | Refills: 0
Start: 2023-04-09 | End: 2023-04-15

## 2023-04-09 RX ADMIN — Medication 650 MILLIGRAM(S): at 14:31

## 2023-04-09 NOTE — ED PROVIDER NOTE - PHYSICAL EXAMINATION
Vital Signs: I have reviewed the initial vital signs.  Constitutional: appears stated age, no acute distress  HEENT: L eye shut with glue covering eyelashes, unable to open, no pain with EOM, able to visualize bright lights through eyelid.  Respiratory: unlabored respiratory effort,   Psychiatric: appropriate mood, appropriate affect

## 2023-04-09 NOTE — CONSULT NOTE ADULT - SUBJECTIVE AND OBJECTIVE BOX
67F with history of poor vision OS presenting to ER after using cryanoacrylate glue dropper in her left eye instead of visine. Ophthalmology consulted for patient inability to get eye open.     on exam upper/lower lashes appear to be tightly adherent with dried glue. Glue + lashes removed at the level of the skin with hany scissors, freeing the lids. glue along the posterior lamella removed. lids pH symmetric and neutral. lids flipped and swabbed. globe irrigated with 1L NS x2 via savage lens with no particles of glue on the ocular surface remaining.    VA: 20/30, 20/100  IOP: 17/17  EOM full OU    L/L: WNL OD; OS with majority of lash stems remaining at the level of the skin of UL and LL  C/S: w/q OU  K: cl OD: inferior abrasion OS  AC: formed OU  I: r/r OU  L: cataract OU  V: cl OU  c/d .2 OU  Mac flat OD: poor view OS due to cornea  Vessels: wnl OD  P: wnl OD    1. abrasion OS secondary to glue  -possible chemical injury ph wnl though  -oflox 0/4  -erytho 3xday to lashes and ocular surface  -PFAT q2h  -f/u [facility] eye clinic monday AM at 830AM    Nadia Yip PGY-3

## 2023-04-09 NOTE — ED PROVIDER NOTE - CLINICAL SUMMARY MEDICAL DECISION MAKING FREE TEXT BOX
67 year old female presenting for unable to open left eyelid. States she accidentally dropped nail glue in her left eye- states she thought it was her eye drop. States her eyelid is glued shut and cannot open her eyes. On exam, patient has dried nail glue eyelids closed shut. Placed bacitracin ointment and massaged on eye; unable after multiple attempts; optho consulted and open eyelids. gave abx; d/c with optho follow up. Patient to be discharged from ED. Any available test results were discussed with patient and/or family. Verbal instructions given, including instructions to return to ED immediately for any new, worsening, or concerning symptoms. Patient endorsed understanding. Written discharge instructions additionally given, including follow-up plan.

## 2023-04-09 NOTE — ED PROVIDER NOTE - OBJECTIVE STATEMENT
Patient is a 67-year-old female presenting after accidentally putting nail glue in her left eye because she thought it was her eyedrops.  This happened approximately 30 minutes prior to arrival, she tried rinsing her eye out for 20 minutes but noted she was unable to open the left eye.  States it feels like a burning sensation underneath.

## 2023-04-09 NOTE — ED PROVIDER NOTE - PATIENT PORTAL LINK FT
You can access the FollowMyHealth Patient Portal offered by [facility] by registering at the following website: [email or web address]. By joining [employer]’s FollowMyHealth portal, you will also be able to view your health information using other applications (apps) compatible with our system.

## 2023-04-09 NOTE — ED PROVIDER NOTE - NSFOLLOWUPCLINICS_GEN_ALL_ED_FT
[facility]  Ophthalmolgy  [address], [address]  [place] [address]  Phone: [phone]  Fax:

## 2023-04-09 NOTE — ED ADULT TRIAGE NOTE - CHIEF COMPLAINT QUOTE
pt went to put eye drops in her eye but instead used finger nail glue by accident. pt left eye is burning

## 2023-04-09 NOTE — ED PROVIDER NOTE - NS ED ATTENDING STATEMENT MOD
I have seen and examined this patient and fully participated in the care of this patient as the teaching attending.  The service was shared with the JAHAIRA.  I reviewed and verified the documentation and independently performed the documented:

## 2023-04-09 NOTE — ED PROVIDER NOTE - ATTENDING CONTRIBUTION TO CARE
CONSTITUTIONAL: Well-developed; well-nourished  SKIN: warm, dry  HEAD: Normocephalic; atraumatic.  EYES: +left eyelids with dry glue on eyelashes; eyelids closed shut  ENT: No nasal discharge; airway clear.  NECK: Supple; non tender.  PSYCH: Cooperative, appropriate.      I personally evaluated the patient. I reviewed the Resident’s  note (as assigned above), and agree with the findings and plan except as documented in my note.

## 2023-04-09 NOTE — ED PROVIDER NOTE - NSFOLLOWUPINSTRUCTIONS_ED_ALL_ED_FT
USE THE EYEDROPS 4 TIMES A DAY    USE THE OINTMENT ONCE A DAY AT NIGHT    FOLLOW UP WITH THE EYE CLINIC TOMORROW MORNING    RETURN TO THE ED IF YOUR SYMPTOMS WORSEN.

## 2023-07-27 ENCOUNTER — APPOINTMENT (OUTPATIENT)
Dept: CARDIOLOGY | Facility: CLINIC | Age: 68
End: 2023-07-27
Payer: MEDICARE

## 2023-07-27 VITALS
DIASTOLIC BLOOD PRESSURE: 78 MMHG | WEIGHT: 216 LBS | HEIGHT: 59 IN | HEART RATE: 61 BPM | SYSTOLIC BLOOD PRESSURE: 126 MMHG | BODY MASS INDEX: 43.55 KG/M2

## 2023-07-27 PROCEDURE — 99213 OFFICE O/P EST LOW 20 MIN: CPT

## 2023-07-27 PROCEDURE — 93000 ELECTROCARDIOGRAM COMPLETE: CPT

## 2023-07-28 NOTE — PHYSICAL EXAM
[No Acute Distress] : no acute distress [Obese] : obese [No Respiratory Distress] : no respiratory distress  [Normal] : no edema, no cyanosis, no clubbing, no varicosities [Moves all extremities] : moves all extremities [No Focal Deficits] : no focal deficits [Alert and Oriented] : alert and oriented

## 2023-07-28 NOTE — REVIEW OF SYSTEMS
[SOB] : no shortness of breath [Dyspnea on exertion] : not dyspnea during exertion [Chest Discomfort] : no chest discomfort [Palpitations] : no palpitations [Syncope] : no syncope [Negative] : Psychiatric

## 2023-07-28 NOTE — HISTORY OF PRESENT ILLNESS
[FreeTextEntry1] : Ms Kam is a 67 year old female patient with PMHx of CAD PCI to RCA (5/21), HTN, DL, asthma and obesity used  Presented today for follow up. \par Currently stable denies chest pain sob or palpitations \par Feeling better. \par EKG sinus rhythm no acute changes. \par echo normal EF grossly unremarkable\par exercise nuclear stress test negative for ischemia.

## 2023-07-28 NOTE — ASSESSMENT
[FreeTextEntry1] : 1) CAD s/p PCI to RCA on 5/2021 , moderate disease  in LAD and LCx. \par continue dapt statin and BB \par currently stable no active symptoms\par echo and Exercise nuclear stress test on 3/2023 unremarkable \par \par 2)DL/obesity \par LDL 63 controlled \par loosing weight \par continue statin \par \par patient advised to continue healthy diet and exercise , weight loss \par \par 3)HTN controlled\par \par Follow up in 6 months.

## 2023-08-22 NOTE — ED ADULT NURSE NOTE - CAS EDN DISCHARGE ASSESSMENT
Alert and oriented to person, place and time Vtama Pregnancy And Lactation Text: It is unknown if this medication can cause problems during pregnancy and breastfeeding.

## 2024-01-11 ENCOUNTER — APPOINTMENT (OUTPATIENT)
Dept: CARDIOLOGY | Facility: CLINIC | Age: 69
End: 2024-01-11
Payer: MEDICARE

## 2024-01-11 VITALS
DIASTOLIC BLOOD PRESSURE: 82 MMHG | HEART RATE: 75 BPM | SYSTOLIC BLOOD PRESSURE: 116 MMHG | WEIGHT: 207 LBS | HEIGHT: 59 IN | BODY MASS INDEX: 41.73 KG/M2

## 2024-01-11 DIAGNOSIS — E66.01 MORBID (SEVERE) OBESITY DUE TO EXCESS CALORIES: ICD-10-CM

## 2024-01-11 DIAGNOSIS — I10 ESSENTIAL (PRIMARY) HYPERTENSION: ICD-10-CM

## 2024-01-11 DIAGNOSIS — E78.5 HYPERLIPIDEMIA, UNSPECIFIED: ICD-10-CM

## 2024-01-11 DIAGNOSIS — I25.10 ATHEROSCLEROTIC HEART DISEASE OF NATIVE CORONARY ARTERY W/OUT ANGINA PECTORIS: ICD-10-CM

## 2024-01-11 DIAGNOSIS — Z98.61 ATHEROSCLEROTIC HEART DISEASE OF NATIVE CORONARY ARTERY W/OUT ANGINA PECTORIS: ICD-10-CM

## 2024-01-11 PROCEDURE — 93000 ELECTROCARDIOGRAM COMPLETE: CPT

## 2024-01-11 PROCEDURE — 99214 OFFICE O/P EST MOD 30 MIN: CPT

## 2024-01-11 RX ORDER — ROSUVASTATIN CALCIUM 40 MG/1
40 TABLET, FILM COATED ORAL DAILY
Qty: 90 | Refills: 3 | Status: ACTIVE | COMMUNITY
Start: 2024-01-11 | End: 1900-01-01

## 2024-01-11 RX ORDER — ATORVASTATIN CALCIUM 80 MG/1
80 TABLET, FILM COATED ORAL DAILY
Qty: 90 | Refills: 3 | Status: DISCONTINUED | COMMUNITY
Start: 1900-01-01 | End: 2024-01-11

## 2024-01-11 NOTE — HISTORY OF PRESENT ILLNESS
[FreeTextEntry1] : Ms Kam is a 67 year old female patient with PMHx of CAD PCI to RCA (5/21), HTN, DL, asthma and obesity used  Presented today for follow up.  Currently stable denies chest pain sob or palpitations   EKG sinus rhythm no acute changes.  3/2023 echo normal EF grossly unremarkable 3/2023 exercise nuclear stress test negative for ischemia.  Labs reviewed DL not controlled  on atorvastatin 80 mg daily , not compliant with diet.

## 2024-01-11 NOTE — ASSESSMENT
[FreeTextEntry1] : 1) CAD s/p PCI to RCA on 5/2021 , moderate disease in LAD and LCx. continue dapt statin and BB currently stable no active symptoms echo and Exercise nuclear stress test on 3/2023 unremarkable  2)DL/obesity   uncontrolled patient is not compliant with diet will change to rosuvastatin 40 mg daily  explained in detail the importance to control DL,  highly advised to be strict on heart healthy diet , exercise and weight loss  loosing weight  will repeat lipid panel prior to next visit   3)HTN controlled Follow up in 6 months.

## 2024-04-04 RX ORDER — EZETIMIBE 10 MG/1
10 TABLET ORAL
Qty: 90 | Refills: 3 | Status: ACTIVE | COMMUNITY
Start: 2023-07-27 | End: 1900-01-01

## 2024-04-04 RX ORDER — METOPROLOL SUCCINATE 25 MG/1
25 TABLET, EXTENDED RELEASE ORAL
Qty: 90 | Refills: 3 | Status: ACTIVE | COMMUNITY
Start: 1900-01-01 | End: 1900-01-01

## 2024-04-04 RX ORDER — ASPIRIN ENTERIC COATED TABLETS 81 MG 81 MG/1
81 TABLET, DELAYED RELEASE ORAL DAILY
Qty: 90 | Refills: 3 | Status: ACTIVE | COMMUNITY
Start: 2021-08-25 | End: 1900-01-01

## 2024-04-04 RX ORDER — CLOPIDOGREL BISULFATE 75 MG/1
75 TABLET, FILM COATED ORAL
Qty: 90 | Refills: 3 | Status: ACTIVE | COMMUNITY
Start: 1900-01-01 | End: 1900-01-01

## 2024-04-22 ENCOUNTER — EMERGENCY (EMERGENCY)
Facility: HOSPITAL | Age: 69
LOS: 0 days | Discharge: HOME | End: 2024-04-22
Attending: STUDENT IN AN ORGANIZED HEALTH CARE EDUCATION/TRAINING PROGRAM
Payer: MEDICARE

## 2024-04-22 VITALS — TEMPERATURE: 100 F

## 2024-04-22 VITALS
RESPIRATION RATE: 24 BRPM | DIASTOLIC BLOOD PRESSURE: 80 MMHG | HEIGHT: 59 IN | WEIGHT: 203.05 LBS | OXYGEN SATURATION: 97 % | TEMPERATURE: 100 F | HEART RATE: 88 BPM | SYSTOLIC BLOOD PRESSURE: 163 MMHG

## 2024-04-22 DIAGNOSIS — Z20.822 CONTACT WITH AND (SUSPECTED) EXPOSURE TO COVID-19: ICD-10-CM

## 2024-04-22 DIAGNOSIS — Z91.018 ALLERGY TO OTHER FOODS: ICD-10-CM

## 2024-04-22 DIAGNOSIS — I25.10 ATHEROSCLEROTIC HEART DISEASE OF NATIVE CORONARY ARTERY WITHOUT ANGINA PECTORIS: ICD-10-CM

## 2024-04-22 DIAGNOSIS — I10 ESSENTIAL (PRIMARY) HYPERTENSION: ICD-10-CM

## 2024-04-22 DIAGNOSIS — Z88.0 ALLERGY STATUS TO PENICILLIN: ICD-10-CM

## 2024-04-22 DIAGNOSIS — J45.909 UNSPECIFIED ASTHMA, UNCOMPLICATED: ICD-10-CM

## 2024-04-22 DIAGNOSIS — Z88.8 ALLERGY STATUS TO OTHER DRUGS, MEDICAMENTS AND BIOLOGICAL SUBSTANCES: ICD-10-CM

## 2024-04-22 DIAGNOSIS — Z91.013 ALLERGY TO SEAFOOD: ICD-10-CM

## 2024-04-22 DIAGNOSIS — Z77.098 CONTACT WITH AND (SUSPECTED) EXPOSURE TO OTHER HAZARDOUS, CHIEFLY NONMEDICINAL, CHEMICALS: ICD-10-CM

## 2024-04-22 DIAGNOSIS — R05.9 COUGH, UNSPECIFIED: ICD-10-CM

## 2024-04-22 DIAGNOSIS — Z95.5 PRESENCE OF CORONARY ANGIOPLASTY IMPLANT AND GRAFT: ICD-10-CM

## 2024-04-22 DIAGNOSIS — Z91.010 ALLERGY TO PEANUTS: ICD-10-CM

## 2024-04-22 DIAGNOSIS — R06.02 SHORTNESS OF BREATH: ICD-10-CM

## 2024-04-22 LAB
ALBUMIN SERPL ELPH-MCNC: 4.7 G/DL — SIGNIFICANT CHANGE UP (ref 3.5–5.2)
ALP SERPL-CCNC: 120 U/L — HIGH (ref 30–115)
ALT FLD-CCNC: 20 U/L — SIGNIFICANT CHANGE UP (ref 0–41)
ANION GAP SERPL CALC-SCNC: 9 MMOL/L — SIGNIFICANT CHANGE UP (ref 7–14)
AST SERPL-CCNC: 21 U/L — SIGNIFICANT CHANGE UP (ref 0–41)
BASOPHILS # BLD AUTO: 0.05 K/UL — SIGNIFICANT CHANGE UP (ref 0–0.2)
BASOPHILS NFR BLD AUTO: 0.7 % — SIGNIFICANT CHANGE UP (ref 0–1)
BILIRUB SERPL-MCNC: 0.9 MG/DL — SIGNIFICANT CHANGE UP (ref 0.2–1.2)
BUN SERPL-MCNC: 16 MG/DL — SIGNIFICANT CHANGE UP (ref 10–20)
CALCIUM SERPL-MCNC: 9.5 MG/DL — SIGNIFICANT CHANGE UP (ref 8.4–10.5)
CHLORIDE SERPL-SCNC: 101 MMOL/L — SIGNIFICANT CHANGE UP (ref 98–110)
CO2 SERPL-SCNC: 27 MMOL/L — SIGNIFICANT CHANGE UP (ref 17–32)
CREAT SERPL-MCNC: 0.7 MG/DL — SIGNIFICANT CHANGE UP (ref 0.7–1.5)
EGFR: 95 ML/MIN/1.73M2 — SIGNIFICANT CHANGE UP
EOSINOPHIL # BLD AUTO: 0.24 K/UL — SIGNIFICANT CHANGE UP (ref 0–0.7)
EOSINOPHIL NFR BLD AUTO: 3.4 % — SIGNIFICANT CHANGE UP (ref 0–8)
FLUAV AG NPH QL: SIGNIFICANT CHANGE UP
FLUBV AG NPH QL: SIGNIFICANT CHANGE UP
GAS PNL BLDV: SIGNIFICANT CHANGE UP
GLUCOSE SERPL-MCNC: 96 MG/DL — SIGNIFICANT CHANGE UP (ref 70–99)
HCT VFR BLD CALC: 35.5 % — LOW (ref 37–47)
HGB BLD-MCNC: 11.7 G/DL — LOW (ref 12–16)
IMM GRANULOCYTES NFR BLD AUTO: 0.4 % — HIGH (ref 0.1–0.3)
LYMPHOCYTES # BLD AUTO: 1.49 K/UL — SIGNIFICANT CHANGE UP (ref 1.2–3.4)
LYMPHOCYTES # BLD AUTO: 21.1 % — SIGNIFICANT CHANGE UP (ref 20.5–51.1)
MCHC RBC-ENTMCNC: 27.9 PG — SIGNIFICANT CHANGE UP (ref 27–31)
MCHC RBC-ENTMCNC: 33 G/DL — SIGNIFICANT CHANGE UP (ref 32–37)
MCV RBC AUTO: 84.7 FL — SIGNIFICANT CHANGE UP (ref 81–99)
MONOCYTES # BLD AUTO: 0.62 K/UL — HIGH (ref 0.1–0.6)
MONOCYTES NFR BLD AUTO: 8.8 % — SIGNIFICANT CHANGE UP (ref 1.7–9.3)
NEUTROPHILS # BLD AUTO: 4.64 K/UL — SIGNIFICANT CHANGE UP (ref 1.4–6.5)
NEUTROPHILS NFR BLD AUTO: 65.6 % — SIGNIFICANT CHANGE UP (ref 42.2–75.2)
NRBC # BLD: 0 /100 WBCS — SIGNIFICANT CHANGE UP (ref 0–0)
PLATELET # BLD AUTO: 186 K/UL — SIGNIFICANT CHANGE UP (ref 130–400)
PMV BLD: 11.1 FL — HIGH (ref 7.4–10.4)
POTASSIUM SERPL-MCNC: 4.4 MMOL/L — SIGNIFICANT CHANGE UP (ref 3.5–5)
POTASSIUM SERPL-SCNC: 4.4 MMOL/L — SIGNIFICANT CHANGE UP (ref 3.5–5)
PROT SERPL-MCNC: 6.9 G/DL — SIGNIFICANT CHANGE UP (ref 6–8)
RBC # BLD: 4.19 M/UL — LOW (ref 4.2–5.4)
RBC # FLD: 13.3 % — SIGNIFICANT CHANGE UP (ref 11.5–14.5)
RSV RNA NPH QL NAA+NON-PROBE: SIGNIFICANT CHANGE UP
SARS-COV-2 RNA SPEC QL NAA+PROBE: SIGNIFICANT CHANGE UP
SODIUM SERPL-SCNC: 137 MMOL/L — SIGNIFICANT CHANGE UP (ref 135–146)
WBC # BLD: 7.07 K/UL — SIGNIFICANT CHANGE UP (ref 4.8–10.8)
WBC # FLD AUTO: 7.07 K/UL — SIGNIFICANT CHANGE UP (ref 4.8–10.8)

## 2024-04-22 PROCEDURE — 85025 COMPLETE CBC W/AUTO DIFF WBC: CPT

## 2024-04-22 PROCEDURE — 71045 X-RAY EXAM CHEST 1 VIEW: CPT

## 2024-04-22 PROCEDURE — 80053 COMPREHEN METABOLIC PANEL: CPT

## 2024-04-22 PROCEDURE — 71045 X-RAY EXAM CHEST 1 VIEW: CPT | Mod: 26

## 2024-04-22 PROCEDURE — 96374 THER/PROPH/DIAG INJ IV PUSH: CPT

## 2024-04-22 PROCEDURE — 84132 ASSAY OF SERUM POTASSIUM: CPT

## 2024-04-22 PROCEDURE — 99285 EMERGENCY DEPT VISIT HI MDM: CPT

## 2024-04-22 PROCEDURE — 99285 EMERGENCY DEPT VISIT HI MDM: CPT | Mod: 25

## 2024-04-22 PROCEDURE — 93005 ELECTROCARDIOGRAM TRACING: CPT

## 2024-04-22 PROCEDURE — 83605 ASSAY OF LACTIC ACID: CPT

## 2024-04-22 PROCEDURE — 94640 AIRWAY INHALATION TREATMENT: CPT

## 2024-04-22 PROCEDURE — 85014 HEMATOCRIT: CPT

## 2024-04-22 PROCEDURE — 82803 BLOOD GASES ANY COMBINATION: CPT

## 2024-04-22 PROCEDURE — 82330 ASSAY OF CALCIUM: CPT

## 2024-04-22 PROCEDURE — 85018 HEMOGLOBIN: CPT

## 2024-04-22 PROCEDURE — 93010 ELECTROCARDIOGRAM REPORT: CPT

## 2024-04-22 PROCEDURE — 0241U: CPT

## 2024-04-22 PROCEDURE — 84295 ASSAY OF SERUM SODIUM: CPT

## 2024-04-22 PROCEDURE — 36415 COLL VENOUS BLD VENIPUNCTURE: CPT

## 2024-04-22 RX ORDER — IPRATROPIUM/ALBUTEROL SULFATE 18-103MCG
3 AEROSOL WITH ADAPTER (GRAM) INHALATION
Refills: 0 | Status: DISCONTINUED | OUTPATIENT
Start: 2024-04-22 | End: 2024-04-22

## 2024-04-22 RX ORDER — ACETAMINOPHEN 500 MG
650 TABLET ORAL ONCE
Refills: 0 | Status: COMPLETED | OUTPATIENT
Start: 2024-04-22 | End: 2024-04-22

## 2024-04-22 RX ADMIN — Medication 3 MILLILITER(S): at 10:48

## 2024-04-22 RX ADMIN — Medication 650 MILLIGRAM(S): at 11:09

## 2024-04-22 RX ADMIN — Medication 125 MILLIGRAM(S): at 10:48

## 2024-04-22 NOTE — ED PROVIDER NOTE - PROGRESS NOTE DETAILS
pt reports feeling improved, wheezes improved on exam. discussed return precautions and need to follow up with pulmonolgy Labs unremarkable.  Chest x-ray shows no obvious opacity.  Meds given in the ED and symptoms improved significantly.  Patient is stable for discharge.  Will have patient follow-up with pulmonology outpatient

## 2024-04-22 NOTE — ED PROVIDER NOTE - OBJECTIVE STATEMENT
patient is a 67yo female PMH CAD w stent, htn, asthma complaining of cough and shortness of breath. pt reports x6 days ago she used Raid ant spray and inhaled some of the fumes, began having shortness of breath that night. pt reports cough x5 days. has been taking Tylenol for chronic pain, has taken 2 Covid tests at home that were negative, has inhaler for asthma that she has not used because it irritates her throat. denies chest pain, leg pain/ swelling, fever, headache, n,v,d,c, recent travel, recent surgery.

## 2024-04-22 NOTE — ED PROVIDER NOTE - CLINICAL SUMMARY MEDICAL DECISION MAKING FREE TEXT BOX
pt with SOB, fever, + exposure to raid spray.     Independently interpretted by Angel Najera DO:  XR interpretation: No cardiopulmonary disease,   EKG interpretation: no DYLAN, NSR    Appropriate medications for patient's presenting complaints were ordered and effects were reassessed. Patient's external records were reviewed    Escalation to admission and/or observation was considered.  Patient feels much better and is comfortable with discharge.  Appropriate follow-up was arranged.    most likely viral + fever cough, negative xray

## 2024-04-22 NOTE — ED PROVIDER NOTE - CARE PROVIDER_API CALL
follow up with your primary doctor,   Phone: (   )    -  Fax: (   )    -  Follow Up Time: 1-3 Days    Jose Angel Kurtz  Pulmonary Disease  [address] [place] [id number]  Phone: [phone]  Fax: [phone]  Follow Up Time: 1-3 Days

## 2024-04-22 NOTE — ED PROVIDER NOTE - PATIENT PORTAL LINK FT
You can access the FollowMyHealth Patient Portal offered by [facility] by registering at the following website: [email or web address]. By joining [employer]’s FollowMyHealth portal, you will also be able to view your health information using other applications (apps) compatible with our system.

## 2024-04-22 NOTE — ED PROVIDER NOTE - PROVIDER TOKENS
FREE:[LAST:[follow up with your primary doctor],PHONE:[(   )    -],FAX:[(   )    -],FOLLOWUP:[1-3 Days]],PROVIDER:[TOKEN:[[id number]],FOLLOWUP:[1-3 Days]]

## 2024-04-22 NOTE — ED PROVIDER NOTE - PHYSICAL EXAMINATION
CONST: Well appearing in NAD  EYES: EOMI, Sclera and conjunctiva clear.   ENT: No nasal discharge. Oropharynx normal appearing, no erythema or exudates. Uvula midline.  NECK: Non-tender, no meningeal signs  CARD: Normal S1 S2; Normal rate and rhythm  RESP: Equal BS B/L, No rhonchi or rales. No distress. b/l wheeze  GI: Soft, non-tender, non-distended.  MS: Normal ROM in all extremities. No midline spinal tenderness.  SKIN: Warm, dry, no acute rashes. Good turgor CONST: Well appearing in NAD  EYES: EOMI, Sclera and conjunctiva clear.   ENT: No nasal discharge. Oropharynx normal appearing, no erythema or exudates. Uvula midline.  NECK: Non-tender, no meningeal signs  CARD: Normal S1 S2; Normal rate and rhythm  RESP: Equal BS B/L, No rhonchi. No distress. b/l wheeze; crackles  GI: Soft, non-tender, non-distended.  MS: Normal ROM in all extremities. No midline spinal tenderness.  SKIN: Warm, dry, no acute rashes. Good turgor

## 2024-04-22 NOTE — ED PROVIDER NOTE - INTERNATIONAL TRAVEL
Recommendations from today's disease management visit:                                                      1. I will send a message to Dr. Lemus to share your concerns about new abdominal stretch marks and possible eye inflammation.    2. We will plan to transfer management of your blood sugars to [facility] diabetes education. You will need to contact them for all future refills and medication adjustments.    3. I will check for interactions between the new supplements you would like to start and your current medications.    Follow-up: Return in about 4 weeks (around 4/27/2023) for MTM Pharmacist Visit.    To schedule another MTM appointment, please call the clinic directly or you may call the MTM scheduling line at [phone] or toll-free at [phone].     My Clinical Pharmacist's contact information:                                                      Please feel free to contact me with any questions or concerns you have.      Yuliya Kaplan, PharmD  Medication Therapy Management Pharmacist  [facility]  Phone: [phone]      No

## 2024-04-22 NOTE — ED ADULT TRIAGE NOTE - CHIEF COMPLAINT QUOTE
"I was trying to get rid of ants using an insect killer spray last Thursday, and ever since I've been short of breath." Pt wheezing. "I was trying to get rid of ants using an insect killer spray last Thursday, and ever since I've been short of breath." Pt wheezing. Triage Peak Flow x 2 attempts = 200.

## 2024-04-22 NOTE — ED ADULT NURSE NOTE - CHIEF COMPLAINT QUOTE
"I was trying to get rid of ants using an insect killer spray last Thursday, and ever since I've been short of breath." Pt wheezing. Triage Peak Flow x 2 attempts = 200.

## 2024-04-22 NOTE — ED PROVIDER NOTE - NSFOLLOWUPINSTRUCTIONS_ED_ALL_ED_FT
Our Emergency Department Referral Coordinators will be reaching out to you in the next 24-48 hours from 9:00am to 5:00pm to schedule a follow up appointment. Please expect a phone call from the hospital in that time frame. If you do not receive a call or if you have any questions or concerns, you can reach them at   [phone].    FOLLOW UP WITH YOUR PRIMARY DOCTOR  FOLLOW UP WITH PULMONOLOGIST  RETURN TO ED FOR NEW OR WORSENING SYMPTOMS    Shortness of breath    Shortness of breath (dyspnea) means you have trouble breathing and could indicate a medical problem. Causes include lung disease, heart disease, low amount of red blood cells (anemia), poor physical fitness, being overweight, smoking, etc. Your health care provider today may not be able to find a cause for your shortness of breath after your exam. In this case, it is important to have a follow-up exam with your primary care physician as instructed. If medicines were prescribed, take them as directed for the full length of time directed. Refrain from tobacco products.    SEEK IMMEDIATE MEDICAL CARE IF YOU HAVE ANY OF THE FOLLOWING SYMPTOMS: worsening shortness of breath, chest pain, back pain, abdominal pain, fever, coughing up blood, lightheadedness/dizziness.    Cough    Coughing is a reflex that clears your throat and your airways. Coughing helps to heal and protect your lungs. It is normal to cough occasionally, but a cough that happens with other symptoms or lasts a long time may be a sign of a condition that needs treatment. Coughing may be caused by infections, asthma or COPD, smoking, postnasal drip, gastroesophageal reflux, as well as other medical conditions. Take medicines only as instructed by your health care provider. Avoid environments or triggers that causes you to cough at work or at home.    SEEK IMMEDIATE MEDICAL CARE IF YOU HAVE ANY OF THE FOLLOWING SYMPTOMS: coughing up blood, shortness of breath, rapid heart rate, chest pain, unexplained weight loss or night sweats.

## 2024-07-03 ENCOUNTER — APPOINTMENT (OUTPATIENT)
Dept: PULMONOLOGY | Facility: CLINIC | Age: 69
End: 2024-07-03

## 2024-07-11 ENCOUNTER — RESULT CHARGE (OUTPATIENT)
Age: 69
End: 2024-07-11

## 2024-07-11 ENCOUNTER — APPOINTMENT (OUTPATIENT)
Dept: CARDIOLOGY | Facility: CLINIC | Age: 69
End: 2024-07-11
Payer: MEDICARE

## 2024-07-11 VITALS
DIASTOLIC BLOOD PRESSURE: 82 MMHG | SYSTOLIC BLOOD PRESSURE: 122 MMHG | HEART RATE: 71 BPM | HEIGHT: 59 IN | WEIGHT: 208 LBS | BODY MASS INDEX: 41.93 KG/M2

## 2024-07-11 DIAGNOSIS — E66.01 MORBID (SEVERE) OBESITY DUE TO EXCESS CALORIES: ICD-10-CM

## 2024-07-11 DIAGNOSIS — Z98.61 ATHEROSCLEROTIC HEART DISEASE OF NATIVE CORONARY ARTERY W/OUT ANGINA PECTORIS: ICD-10-CM

## 2024-07-11 DIAGNOSIS — E78.5 HYPERLIPIDEMIA, UNSPECIFIED: ICD-10-CM

## 2024-07-11 DIAGNOSIS — I10 ESSENTIAL (PRIMARY) HYPERTENSION: ICD-10-CM

## 2024-07-11 DIAGNOSIS — I25.10 ATHEROSCLEROTIC HEART DISEASE OF NATIVE CORONARY ARTERY W/OUT ANGINA PECTORIS: ICD-10-CM

## 2024-07-11 PROCEDURE — 99214 OFFICE O/P EST MOD 30 MIN: CPT

## 2024-07-11 PROCEDURE — 93000 ELECTROCARDIOGRAM COMPLETE: CPT

## 2024-07-11 RX ORDER — ATORVASTATIN CALCIUM 80 MG/1
80 TABLET, FILM COATED ORAL
Refills: 0 | Status: ACTIVE | COMMUNITY

## 2024-07-31 ENCOUNTER — APPOINTMENT (OUTPATIENT)
Dept: CARDIOLOGY | Facility: CLINIC | Age: 69
End: 2024-07-31
Payer: MEDICARE

## 2024-07-31 VITALS
DIASTOLIC BLOOD PRESSURE: 76 MMHG | HEART RATE: 67 BPM | SYSTOLIC BLOOD PRESSURE: 126 MMHG | BODY MASS INDEX: 41.73 KG/M2 | WEIGHT: 207 LBS | HEIGHT: 59 IN

## 2024-07-31 VITALS — SYSTOLIC BLOOD PRESSURE: 130 MMHG | DIASTOLIC BLOOD PRESSURE: 70 MMHG

## 2024-07-31 DIAGNOSIS — I25.10 ATHEROSCLEROTIC HEART DISEASE OF NATIVE CORONARY ARTERY W/OUT ANGINA PECTORIS: ICD-10-CM

## 2024-07-31 DIAGNOSIS — E66.01 MORBID (SEVERE) OBESITY DUE TO EXCESS CALORIES: ICD-10-CM

## 2024-07-31 DIAGNOSIS — Z98.61 ATHEROSCLEROTIC HEART DISEASE OF NATIVE CORONARY ARTERY W/OUT ANGINA PECTORIS: ICD-10-CM

## 2024-07-31 DIAGNOSIS — E78.5 HYPERLIPIDEMIA, UNSPECIFIED: ICD-10-CM

## 2024-07-31 PROCEDURE — 93000 ELECTROCARDIOGRAM COMPLETE: CPT

## 2024-07-31 PROCEDURE — G2211 COMPLEX E/M VISIT ADD ON: CPT

## 2024-07-31 PROCEDURE — 99214 OFFICE O/P EST MOD 30 MIN: CPT

## 2024-07-31 RX ORDER — BEMPEDOIC ACID 180 MG/1
180 TABLET, FILM COATED ORAL
Qty: 30 | Refills: 3 | Status: ACTIVE | COMMUNITY
Start: 2024-07-31 | End: 1900-01-01

## 2024-07-31 RX ORDER — ALBUTEROL SULFATE 90 UG/1
108 AEROSOL, METERED RESPIRATORY (INHALATION)
Refills: 0 | Status: ACTIVE | COMMUNITY

## 2024-07-31 RX ORDER — ATORVASTATIN CALCIUM 80 MG/1
80 TABLET, FILM COATED ORAL
Refills: 0 | Status: ACTIVE | COMMUNITY

## 2024-07-31 RX ORDER — ALBUTEROL SULFATE 2.5 MG/3ML
(2.5 MG/3ML) SOLUTION RESPIRATORY (INHALATION)
Refills: 0 | Status: ACTIVE | COMMUNITY

## 2024-07-31 RX ORDER — LATANOPROST/PF 0.005 %
0.01 DROPS OPHTHALMIC (EYE)
Refills: 0 | Status: ACTIVE | COMMUNITY

## 2024-07-31 NOTE — ASSESSMENT
[FreeTextEntry1] : 68 year old woman with CAD s/p PCI to the RCA, HTN and HLD who presents for a Preventive Cardiology visit.  1. CAD/HLD: she had a PCI to her RCA. Her LDL goal is less than 70 and she is currently at 135. She confirms compliance of her medications. We discussed diet and exercise at length. She is already on high intensity statin and Zetia. She is a perfect candidate for PCSK-9 inhibitor therapy however, she looked up the side effects and does not want to take it. She also does not want to be on any injectable medications. She also called her insurance and pharmacy and the injection would be $47 every two weeks. She cannot afford this. There is no point offering her Inclisiran as she does not want to be on injectables. I discussed Bempedoic Acid and she is willing to try this if we can get it approved.   The secondary prevention of heart disease was discussed in detail with the patient, including adhering to a heart healthy, plant based, or Mediterranean diet, and the importance of 30 minutes of moderate intensity activity for 30 minutes, 5 times a week. All the patient's questions were answered.  RTC PRN.

## 2024-07-31 NOTE — HISTORY OF PRESENT ILLNESS
[FreeTextEntry1] : IMAN OGLESBY is a 68 year old woman with CAD s/p PCI to the RCA, HTN and HLD who presents for a Preventive Cardiology visit. She is a patient of Dr. Garvey.   The patient denies chest pain, shortness of breath, palpitations and syncope. No leg swelling, orthopnea and PND.  For her CAD/HLD, she is taking atorvastatin 80mg daily and zetia and tolerating these well. She was unable to tolerate crestor 40mg due to side effects.   In terms of her diet: Breakfast: eggs, waffles, oatmeal, vegan sausage/castano Lunch: does not take Dinner: Air murphy protein and vegetables  She does not have an exercise routine, but she does 10,000 steps daily.

## 2024-08-28 ENCOUNTER — APPOINTMENT (OUTPATIENT)
Dept: PAIN MANAGEMENT | Facility: CLINIC | Age: 69
End: 2024-08-28

## 2024-08-28 VITALS
SYSTOLIC BLOOD PRESSURE: 128 MMHG | BODY MASS INDEX: 41.73 KG/M2 | DIASTOLIC BLOOD PRESSURE: 76 MMHG | WEIGHT: 207 LBS | HEIGHT: 59 IN

## 2024-08-28 DIAGNOSIS — M54.16 RADICULOPATHY, LUMBAR REGION: ICD-10-CM

## 2024-08-28 DIAGNOSIS — M54.59 OTHER LOW BACK PAIN: ICD-10-CM

## 2024-08-28 DIAGNOSIS — M79.18 MYALGIA, OTHER SITE: ICD-10-CM

## 2024-08-28 PROCEDURE — 99204 OFFICE O/P NEW MOD 45 MIN: CPT | Mod: 25

## 2024-08-28 PROCEDURE — 20552 NJX 1/MLT TRIGGER POINT 1/2: CPT

## 2024-08-28 RX ORDER — CYCLOBENZAPRINE HYDROCHLORIDE 5 MG/1
5 TABLET, FILM COATED ORAL
Qty: 30 | Refills: 1 | Status: ACTIVE | COMMUNITY
Start: 2024-08-28 | End: 1900-01-01

## 2024-08-28 NOTE — DATA REVIEWED
[FreeTextEntry1] : MRI of the lumbar spine taken on 9-3-2021 showed small subligamentous disc herniation centrally and to the left of midline at L5-S1 slightly compressing the left S1 root sleeve. The previous study did not demonstrate any compression or displacement of the left S1 root sleeve. Small diffuse annular bulges at L3-4 and L4-5 are unchanged.

## 2024-08-28 NOTE — PROCEDURE
[Trigger point 1-2 muscle groups] : trigger point 1-2 muscle groups [Lumbar paraspinal muscle] : lumbar paraspinal muscle [Pain] : pain [Alcohol] : alcohol [Ethyl Chloride sprayed topically] : ethyl chloride sprayed topically [Sterile technique used] : sterile technique used [___ cc    0.5%] : Bupivacaine (Marcaine) ~Vcc of 0.5%  [___ cc    4mg] : Dexamethasone (Decadron) ~Vcc of 4 mg  [___ cc    30mg] : Ketorolac (Toradol) ~Vcc of 30 mg  [] : Patient tolerated procedure well [Call if redness, pain or fever occur] : call if redness, pain or fever occur [Apply ice for 15min out of every hour for the next 12-24 hours as tolerated] : apply ice for 15 minutes out of every hour for the next 12-24 hours as tolerated [Previous OTC use and PT nontherapeutic] : patient has tried OTC's including aspirin, Ibuprofen, Aleve, etc or prescription NSAIDS, and/or exercises at home and/or physical therapy without satisfactory response [Risks, benefits, alternatives discussed / Verbal consent obtained] : the risks benefits, and alternatives have been discussed, and verbal consent was obtained [de-identified] : Chlorhexidine

## 2024-08-28 NOTE — DISCUSSION/SUMMARY
[de-identified] : A discussion regarding available pain management treatment options occurred with the patient. These included interventional, rehabilitative, pharmacological, and alternative modalities. We will proceed with the following:   Interventional treatment options: 1. Done in office today: Trigger point injection The risks, benefits and alternatives of the proposed procedure were explained in detail with the patient.  The risks outlined include, but are not limited to, infection, bleeding, nerve injury, a temporary increase in pain, failure to resolve symptoms, allergic reaction, and possible elevation of blood sugar in diabetics.  All questions were answered to patient's apparent satisfaction and he/she verbalized an understanding.  Rehabilitative options: 1. Start an active HEP.  Patient given specific exercises to do including but not limited to: side plank, Quadruped arm/leg raise, Extension exercise, and Glut Bridges   Medication: 1. Ordered Cyclobenzaprine 5 mg qhs.   We are prescribing a muscle relaxant medication to help the patient's muscle spasm pain. The patient understands to not take this medication before driving or operating any heavy machinery as it can be sedating.   - Follow up in 2 weeks. If her pain still persists, I will move forward with an epidural injection.   I Jossie Dhillon attest that this documentation has been prepared under the direction and in the presence of provider Dr. Phi Gary.  The documentation recorded by the scribe in my presence, accurately reflects the service I personally performed, and the decisions made by me with my edits as appropriate.   Phi Gary, DO

## 2024-08-28 NOTE — HISTORY OF PRESENT ILLNESS
[FreeTextEntry1] : Ms. Kam is a 68-year-old female presenting to establish care for her lower back pain. She recently started working out in March of 2024. Over the last couple of months, she has been experiencing left sided lower back pain after working out. She denies any true injury or any recent trauma. Pain is mainly on the left side of the lower back with radicular component. Pain is made worse with flexion-based movements. She notes sharp, stabbing, shooting pains. She can walk without any pain however if she walks for long periods of time she starts to develop pain. She has been moving very gingerly due to the pain. She has no pain when sitting. Pain is present daily. She denies any changes in bowel/bladder habits, fevers/chills or any recent weight loss.   Of note, she is on blood thinners (Aspirin 81 mg and Plavix) and does have a history of STENT placement.

## 2024-08-28 NOTE — PHYSICAL EXAM
[de-identified] : Lumbar Spine Exam: Inspection: erythema (-) ecchymosis (-) rashes (-) alignment: no scoliosis  Palpation: Midline lumbar tenderness:           L (-)    (-) paraspinal tenderness:                  L (-) ; R (-) sciatic nerve tenderness :             L (-) ; R (-) SI joint tenderness:                        L (-) ; R (-) GTB tenderness:                            L (-);  R (-)  Limited ROM 2/2 to pain with lumbar flexion and extension w/ rotation to R and L side  Strength: 5/5 throughout all muscle groups of the lower extremity                                     Right       Left    Hip Flexion:                (5/5)       (5/5) Quadriceps:               (5/5)       (5/5) Hamstrings:                (5/5)       (5/5) Ankle Dorsiflexion:     (5/5)       (5/5) EHL:                           (5/5)       (5/5) Ankle Plantarflexion:  (5/5)       (5/5)  Special Tests: SLR:                           R (-) ; L (+) Facet loading:            R (-) ; L (+) GALE test:               R (-) ; L (-)  Neurologic: Light touch intact throughout LE  Reflexes normal and symmetric   Gait: mildly antalgic gait ambulates w/o assistive device

## 2024-09-11 ENCOUNTER — APPOINTMENT (OUTPATIENT)
Dept: PAIN MANAGEMENT | Facility: CLINIC | Age: 69
End: 2024-09-11

## 2024-09-27 ENCOUNTER — APPOINTMENT (OUTPATIENT)
Dept: PAIN MANAGEMENT | Facility: CLINIC | Age: 69
End: 2024-09-27
Payer: MEDICARE

## 2024-09-27 DIAGNOSIS — M54.16 RADICULOPATHY, LUMBAR REGION: ICD-10-CM

## 2024-09-27 DIAGNOSIS — M79.18 MYALGIA, OTHER SITE: ICD-10-CM

## 2024-09-27 PROCEDURE — 99214 OFFICE O/P EST MOD 30 MIN: CPT

## 2024-09-30 NOTE — DISCUSSION/SUMMARY
[de-identified] : A discussion regarding available pain management treatment options occurred with the patient. These included interventional, rehabilitative, pharmacological, and alternative modalities. We will proceed with the following:   Interventional treatment options: 1. proceed with a Left L5-S1, S1 transforaminal epidural steroid injection with sedation.  Treatment options were discussed. The patient has been having persistent, severe low back pain and lumbar radicular pain that has minimally improved with conservative therapy thus far (including physical therapy, home stretching and anti-inflammatory medications. The patient was given the option of proceeding with a lumbar epidural steroid injection to try and get the patient some pain relief. The risks and benefits were discussed, which included the associated problems with steroids, bleeding, nerve injury, lack of improvement with pain, and 0.5% chance of a post dural puncture headache.    The patient has severe anxiety of procedures that necessitates monitored anesthesia care (MAC). The procedure performed will be close to major nerves, arteries, and spinal cord and/or joint structures. Due to the proximity of these structures, we need the patient to be still during the procedure.  With the help of MAC, this will be safely achieved and decrease the risk of any complications.   Of note, she is on Plavix. She does have a history of STENTs. We will reach out to her cardiologist to obtain clearance before moving forward.   - Follow up 2 weeks post injection.   I Jossie Dhillon attest that this documentation has been prepared under the direction and in the presence of provider Dr. Phi Gary.  The documentation recorded by the scribe in my presence, accurately reflects the service I personally performed, and the decisions made by me with my edits as appropriate.   Phi Gary, DO

## 2024-09-30 NOTE — PHYSICAL EXAM
[de-identified] : Lumbar Spine Exam: Inspection: erythema (-) ecchymosis (-) rashes (-) alignment: no scoliosis  Palpation: Midline lumbar tenderness:           L (-)    (-) paraspinal tenderness:                  L (-) ; R (-) sciatic nerve tenderness :             L (-) ; R (-) SI joint tenderness:                        L (-) ; R (-) GTB tenderness:                            L (-);  R (-)  Limited ROM 2/2 to pain with lumbar flexion and extension w/ rotation to R and L side  Strength: 5/5 throughout all muscle groups of the lower extremity                                     Right       Left    Hip Flexion:                (5/5)       (5/5) Quadriceps:               (5/5)       (5/5) Hamstrings:                (5/5)       (5/5) Ankle Dorsiflexion:     (5/5)       (5/5) EHL:                           (5/5)       (5/5) Ankle Plantarflexion:  (5/5)       (5/5)  Special Tests: SLR:                           R (-) ; L (+) Facet loading:            R (-) ; L (+) GALE test:               R (-) ; L (-)  Neurologic: Light touch intact throughout LE  Reflexes normal and symmetric   Gait: mildly antalgic gait ambulates w/o assistive device

## 2024-09-30 NOTE — PHYSICAL EXAM
[de-identified] : Lumbar Spine Exam: Inspection: erythema (-) ecchymosis (-) rashes (-) alignment: no scoliosis  Palpation: Midline lumbar tenderness:           L (-)    (-) paraspinal tenderness:                  L (-) ; R (-) sciatic nerve tenderness :             L (-) ; R (-) SI joint tenderness:                        L (-) ; R (-) GTB tenderness:                            L (-);  R (-)  Limited ROM 2/2 to pain with lumbar flexion and extension w/ rotation to R and L side  Strength: 5/5 throughout all muscle groups of the lower extremity                                     Right       Left    Hip Flexion:                (5/5)       (5/5) Quadriceps:               (5/5)       (5/5) Hamstrings:                (5/5)       (5/5) Ankle Dorsiflexion:     (5/5)       (5/5) EHL:                           (5/5)       (5/5) Ankle Plantarflexion:  (5/5)       (5/5)  Special Tests: SLR:                           R (-) ; L (+) Facet loading:            R (-) ; L (+) GALE test:               R (-) ; L (-)  Neurologic: Light touch intact throughout LE  Reflexes normal and symmetric   Gait: mildly antalgic gait ambulates w/o assistive device

## 2024-09-30 NOTE — HISTORY OF PRESENT ILLNESS
[FreeTextEntry1] : Ms. Kam is a 68-year-old female presenting to establish care for her lower back pain. She recently started working out in March of 2024. Over the last couple of months, she has been experiencing left sided lower back pain after working out. She denies any true injury or any recent trauma. Pain is mainly on the left side of the lower back with radicular component. Pain is made worse with flexion-based movements. She notes sharp, stabbing, shooting pains. She can walk without any pain however if she walks for long periods of time she starts to develop pain. She has been moving very gingerly due to the pain. She has no pain when sitting. Pain is present daily. She denies any changes in bowel/bladder habits, fevers/chills or any recent weight loss.   Of note, she is on blood thinners (Aspirin 81 mg and Plavix) and does have a history of STENT placement.   9-: Revisit encounter.   She is presenting today with ongoing back pain. She underwent a trigger point injection at her last office visit which did not provide with her with any substantial relief. She feels pain starting in her coccyx region and refers upwards into the lower lumbar region. She also notes pain referring into the left worse than right lower extremity. She has sharp, shooting pains with numbness and tingling. She does also experience numbness and tingling in her hands as well. Pain is persistent and daily. She has pain when standing or walking for prolonged periods of time. She has been using a came to ambulate. She was taking Cyclobenzaprine 5 mg however she stopped this medication as she was getting cramps in the legs.

## 2024-09-30 NOTE — DISCUSSION/SUMMARY
[de-identified] : A discussion regarding available pain management treatment options occurred with the patient. These included interventional, rehabilitative, pharmacological, and alternative modalities. We will proceed with the following:   Interventional treatment options: 1. proceed with a Left L5-S1, S1 transforaminal epidural steroid injection with sedation.  Treatment options were discussed. The patient has been having persistent, severe low back pain and lumbar radicular pain that has minimally improved with conservative therapy thus far (including physical therapy, home stretching and anti-inflammatory medications. The patient was given the option of proceeding with a lumbar epidural steroid injection to try and get the patient some pain relief. The risks and benefits were discussed, which included the associated problems with steroids, bleeding, nerve injury, lack of improvement with pain, and 0.5% chance of a post dural puncture headache.    The patient has severe anxiety of procedures that necessitates monitored anesthesia care (MAC). The procedure performed will be close to major nerves, arteries, and spinal cord and/or joint structures. Due to the proximity of these structures, we need the patient to be still during the procedure.  With the help of MAC, this will be safely achieved and decrease the risk of any complications.   Of note, she is on Plavix. She does have a history of STENTs. We will reach out to her cardiologist to obtain clearance before moving forward.   - Follow up 2 weeks post injection.   I Jossie Dhillon attest that this documentation has been prepared under the direction and in the presence of provider Dr. Phi Gary.  The documentation recorded by the scribe in my presence, accurately reflects the service I personally performed, and the decisions made by me with my edits as appropriate.   Phi Gary, DO

## 2024-10-16 ENCOUNTER — APPOINTMENT (OUTPATIENT)
Dept: PULMONOLOGY | Facility: CLINIC | Age: 69
End: 2024-10-16
Payer: MEDICARE

## 2024-10-16 VITALS
WEIGHT: 204 LBS | DIASTOLIC BLOOD PRESSURE: 82 MMHG | HEIGHT: 59 IN | RESPIRATION RATE: 14 BRPM | BODY MASS INDEX: 41.12 KG/M2 | SYSTOLIC BLOOD PRESSURE: 128 MMHG | HEART RATE: 79 BPM | OXYGEN SATURATION: 97 %

## 2024-10-16 DIAGNOSIS — Z79.52 LONG TERM (CURRENT) USE OF SYSTEMIC STEROIDS: ICD-10-CM

## 2024-10-16 DIAGNOSIS — J45.30 MILD PERSISTENT ASTHMA, UNCOMPLICATED: ICD-10-CM

## 2024-10-16 PROCEDURE — G2211 COMPLEX E/M VISIT ADD ON: CPT

## 2024-10-16 PROCEDURE — 99204 OFFICE O/P NEW MOD 45 MIN: CPT

## 2024-10-16 RX ORDER — BUDESONIDE AND FORMOTEROL FUMARATE DIHYDRATE 80; 4.5 UG/1; UG/1
80-4.5 AEROSOL RESPIRATORY (INHALATION) TWICE DAILY
Qty: 1 | Refills: 4 | Status: ACTIVE | COMMUNITY
Start: 2024-10-16 | End: 1900-01-01

## 2024-10-16 RX ORDER — PAROXETINE HYDROCHLORIDE 40 MG/1
TABLET, FILM COATED ORAL
Refills: 0 | Status: ACTIVE | COMMUNITY

## 2024-10-17 ENCOUNTER — RX RENEWAL (OUTPATIENT)
Age: 69
End: 2024-10-17

## 2024-10-21 ENCOUNTER — OUTPATIENT (OUTPATIENT)
Dept: OUTPATIENT SERVICES | Facility: HOSPITAL | Age: 69
LOS: 1 days | End: 2024-10-21
Payer: MEDICARE

## 2024-10-21 ENCOUNTER — APPOINTMENT (OUTPATIENT)
Dept: PULMONOLOGY | Facility: HOSPITAL | Age: 69
End: 2024-10-21

## 2024-10-21 DIAGNOSIS — R06.02 SHORTNESS OF BREATH: ICD-10-CM

## 2024-10-21 PROCEDURE — 94664 DEMO&/EVAL PT USE INHALER: CPT

## 2024-10-21 PROCEDURE — 94070 EVALUATION OF WHEEZING: CPT

## 2024-10-21 PROCEDURE — 94726 PLETHYSMOGRAPHY LUNG VOLUMES: CPT

## 2024-10-21 PROCEDURE — 94729 DIFFUSING CAPACITY: CPT

## 2024-10-22 DIAGNOSIS — R06.02 SHORTNESS OF BREATH: ICD-10-CM

## 2024-11-13 ENCOUNTER — APPOINTMENT (OUTPATIENT)
Dept: OBGYN | Facility: CLINIC | Age: 69
End: 2024-11-13

## 2024-11-27 ENCOUNTER — RX RENEWAL (OUTPATIENT)
Age: 69
End: 2024-11-27

## 2025-01-16 ENCOUNTER — APPOINTMENT (OUTPATIENT)
Dept: PULMONOLOGY | Facility: CLINIC | Age: 70
End: 2025-01-16

## 2025-02-05 ENCOUNTER — APPOINTMENT (OUTPATIENT)
Dept: CARDIOLOGY | Facility: CLINIC | Age: 70
End: 2025-02-05
Payer: MEDICARE

## 2025-02-05 ENCOUNTER — NON-APPOINTMENT (OUTPATIENT)
Age: 70
End: 2025-02-05

## 2025-02-05 VITALS
DIASTOLIC BLOOD PRESSURE: 78 MMHG | HEIGHT: 59 IN | SYSTOLIC BLOOD PRESSURE: 122 MMHG | HEART RATE: 72 BPM | WEIGHT: 201 LBS | BODY MASS INDEX: 40.52 KG/M2

## 2025-02-05 DIAGNOSIS — Z98.61 ATHEROSCLEROTIC HEART DISEASE OF NATIVE CORONARY ARTERY W/OUT ANGINA PECTORIS: ICD-10-CM

## 2025-02-05 DIAGNOSIS — E78.5 HYPERLIPIDEMIA, UNSPECIFIED: ICD-10-CM

## 2025-02-05 DIAGNOSIS — I10 ESSENTIAL (PRIMARY) HYPERTENSION: ICD-10-CM

## 2025-02-05 DIAGNOSIS — I25.10 ATHEROSCLEROTIC HEART DISEASE OF NATIVE CORONARY ARTERY W/OUT ANGINA PECTORIS: ICD-10-CM

## 2025-02-05 PROCEDURE — 93000 ELECTROCARDIOGRAM COMPLETE: CPT

## 2025-02-05 PROCEDURE — 99214 OFFICE O/P EST MOD 30 MIN: CPT | Mod: 25

## 2025-03-19 ENCOUNTER — APPOINTMENT (OUTPATIENT)
Dept: PULMONOLOGY | Facility: CLINIC | Age: 70
End: 2025-03-19
Payer: MEDICARE

## 2025-03-19 VITALS
WEIGHT: 204 LBS | HEIGHT: 59 IN | SYSTOLIC BLOOD PRESSURE: 126 MMHG | HEART RATE: 76 BPM | DIASTOLIC BLOOD PRESSURE: 82 MMHG | RESPIRATION RATE: 14 BRPM | OXYGEN SATURATION: 96 % | BODY MASS INDEX: 41.12 KG/M2

## 2025-03-19 DIAGNOSIS — Z79.52 LONG TERM (CURRENT) USE OF SYSTEMIC STEROIDS: ICD-10-CM

## 2025-03-19 DIAGNOSIS — J45.30 MILD PERSISTENT ASTHMA, UNCOMPLICATED: ICD-10-CM

## 2025-03-19 PROCEDURE — G2211 COMPLEX E/M VISIT ADD ON: CPT

## 2025-03-19 PROCEDURE — 99214 OFFICE O/P EST MOD 30 MIN: CPT

## 2025-03-19 RX ORDER — ALBUTEROL SULFATE 90 UG/1
108 (90 BASE) AEROSOL, METERED RESPIRATORY (INHALATION)
Qty: 1 | Refills: 4 | Status: ACTIVE | COMMUNITY
Start: 2025-03-19 | End: 1900-01-01

## 2025-06-03 ENCOUNTER — OUTPATIENT (OUTPATIENT)
Dept: OUTPATIENT SERVICES | Facility: HOSPITAL | Age: 70
LOS: 1 days | End: 2025-06-03
Payer: SELF-PAY

## 2025-06-03 DIAGNOSIS — K02.9 DENTAL CARIES, UNSPECIFIED: ICD-10-CM

## 2025-06-03 PROCEDURE — D0140: CPT

## 2025-06-03 PROCEDURE — D0330: CPT

## 2025-06-03 PROCEDURE — D7140: CPT

## 2025-06-04 DIAGNOSIS — K02.9 DENTAL CARIES, UNSPECIFIED: ICD-10-CM

## 2025-06-17 ENCOUNTER — APPOINTMENT (OUTPATIENT)
Dept: CARDIOLOGY | Facility: CLINIC | Age: 70
End: 2025-06-17

## 2025-06-17 PROCEDURE — 93306 TTE W/DOPPLER COMPLETE: CPT

## 2025-07-30 ENCOUNTER — APPOINTMENT (OUTPATIENT)
Dept: CARDIOLOGY | Facility: CLINIC | Age: 70
End: 2025-07-30
Payer: MEDICARE

## 2025-07-30 VITALS
WEIGHT: 201 LBS | HEART RATE: 77 BPM | HEIGHT: 59 IN | DIASTOLIC BLOOD PRESSURE: 70 MMHG | SYSTOLIC BLOOD PRESSURE: 140 MMHG | BODY MASS INDEX: 40.52 KG/M2

## 2025-07-30 DIAGNOSIS — I25.10 ATHEROSCLEROTIC HEART DISEASE OF NATIVE CORONARY ARTERY W/OUT ANGINA PECTORIS: ICD-10-CM

## 2025-07-30 DIAGNOSIS — E66.01 MORBID (SEVERE) OBESITY DUE TO EXCESS CALORIES: ICD-10-CM

## 2025-07-30 DIAGNOSIS — Z98.61 ATHEROSCLEROTIC HEART DISEASE OF NATIVE CORONARY ARTERY W/OUT ANGINA PECTORIS: ICD-10-CM

## 2025-07-30 DIAGNOSIS — E78.5 HYPERLIPIDEMIA, UNSPECIFIED: ICD-10-CM

## 2025-07-30 DIAGNOSIS — I10 ESSENTIAL (PRIMARY) HYPERTENSION: ICD-10-CM

## 2025-07-30 PROCEDURE — 99214 OFFICE O/P EST MOD 30 MIN: CPT | Mod: 25

## 2025-07-30 PROCEDURE — 93000 ELECTROCARDIOGRAM COMPLETE: CPT

## 2025-08-21 ENCOUNTER — TRANSCRIPTION ENCOUNTER (OUTPATIENT)
Age: 70
End: 2025-08-21

## 2025-08-21 ENCOUNTER — APPOINTMENT (OUTPATIENT)
Dept: ORTHOPEDIC SURGERY | Facility: CLINIC | Age: 70
End: 2025-08-21
Payer: MEDICARE

## 2025-08-21 DIAGNOSIS — M17.0 BILATERAL PRIMARY OSTEOARTHRITIS OF KNEE: ICD-10-CM

## 2025-08-21 PROCEDURE — 73560 X-RAY EXAM OF KNEE 1 OR 2: CPT | Mod: RT

## 2025-08-21 PROCEDURE — 99203 OFFICE O/P NEW LOW 30 MIN: CPT

## 2025-09-04 RX ORDER — PANTOPRAZOLE 20 MG/1
20 TABLET, DELAYED RELEASE ORAL DAILY
Qty: 90 | Refills: 3 | Status: ACTIVE | COMMUNITY
Start: 2025-09-04 | End: 1900-01-01

## 2025-09-19 ENCOUNTER — APPOINTMENT (OUTPATIENT)
Dept: ORTHOPEDIC SURGERY | Facility: CLINIC | Age: 70
End: 2025-09-19
Payer: MEDICARE

## 2025-09-19 DIAGNOSIS — M17.0 BILATERAL PRIMARY OSTEOARTHRITIS OF KNEE: ICD-10-CM

## 2025-09-19 PROCEDURE — 20610 DRAIN/INJ JOINT/BURSA W/O US: CPT | Mod: RT
